# Patient Record
Sex: FEMALE | Race: WHITE | NOT HISPANIC OR LATINO | Employment: OTHER | URBAN - METROPOLITAN AREA
[De-identification: names, ages, dates, MRNs, and addresses within clinical notes are randomized per-mention and may not be internally consistent; named-entity substitution may affect disease eponyms.]

---

## 2019-10-31 ENCOUNTER — TELEPHONE (OUTPATIENT)
Dept: NEUROLOGY | Facility: CLINIC | Age: 64
End: 2019-10-31

## 2019-10-31 NOTE — TELEPHONE ENCOUNTER
----- Message from Ela Madden sent at 10/30/2019  2:42 PM CDT -----  Regarding: External referral   Hello,    Patient is being referred to Dr. Miller for small fiber neuropathy by Dr. Yrn Martinez. Referral and records are scanned into . I am unable to schedule for Dr. Miller and no appointments are pulling.    Can you please review and contact patient to schedule appointment. Referring office can be reached at [phone].    Thank you,  Ela  Vel Roth

## 2019-10-31 NOTE — TELEPHONE ENCOUNTER
Called pt to schedule consult with Dr. Miller for possible small fiber neuropathy from referral from Dr. Martinez; date/time/location confirmed; appt added to waiting list; verbalized understanding; appt slip mailed to address confirmed.

## 2019-12-13 ENCOUNTER — OFFICE VISIT (OUTPATIENT)
Dept: NEUROLOGY | Facility: CLINIC | Age: 64
End: 2019-12-13
Payer: COMMERCIAL

## 2019-12-13 VITALS
HEART RATE: 84 BPM | DIASTOLIC BLOOD PRESSURE: 78 MMHG | BODY MASS INDEX: 21.83 KG/M2 | SYSTOLIC BLOOD PRESSURE: 160 MMHG | WEIGHT: 127.88 LBS | HEIGHT: 64 IN | RESPIRATION RATE: 18 BRPM

## 2019-12-13 DIAGNOSIS — R79.1 ABNORMAL COAGULATION PROFILE: ICD-10-CM

## 2019-12-13 DIAGNOSIS — G62.9 SMALL FIBER NEUROPATHY: Primary | ICD-10-CM

## 2019-12-13 DIAGNOSIS — M79.2 NEUROPATHIC PAIN: ICD-10-CM

## 2019-12-13 PROCEDURE — 99205 PR OFFICE/OUTPT VISIT, NEW, LEVL V, 60-74 MIN: ICD-10-PCS | Mod: S$GLB,,, | Performed by: PSYCHIATRY & NEUROLOGY

## 2019-12-13 PROCEDURE — 99999 PR PBB SHADOW E&M-EST. PATIENT-LVL III: ICD-10-PCS | Mod: PBBFAC,,, | Performed by: PSYCHIATRY & NEUROLOGY

## 2019-12-13 PROCEDURE — 99999 PR PBB SHADOW E&M-EST. PATIENT-LVL III: CPT | Mod: PBBFAC,,, | Performed by: PSYCHIATRY & NEUROLOGY

## 2019-12-13 PROCEDURE — 3008F PR BODY MASS INDEX (BMI) DOCUMENTED: ICD-10-PCS | Mod: CPTII,S$GLB,, | Performed by: PSYCHIATRY & NEUROLOGY

## 2019-12-13 PROCEDURE — 99205 OFFICE O/P NEW HI 60 MIN: CPT | Mod: S$GLB,,, | Performed by: PSYCHIATRY & NEUROLOGY

## 2019-12-13 PROCEDURE — 3008F BODY MASS INDEX DOCD: CPT | Mod: CPTII,S$GLB,, | Performed by: PSYCHIATRY & NEUROLOGY

## 2019-12-13 RX ORDER — GABAPENTIN 100 MG/1
200 CAPSULE ORAL 4 TIMES DAILY
COMMUNITY
End: 2020-03-03 | Stop reason: SDUPTHER

## 2019-12-13 RX ORDER — LORAZEPAM 0.5 MG/1
0.5 TABLET ORAL NIGHTLY
COMMUNITY
Start: 2019-11-11 | End: 2024-01-29

## 2019-12-13 RX ORDER — LANOLIN ALCOHOL/MO/W.PET/CERES
100 CREAM (GRAM) TOPICAL DAILY
COMMUNITY

## 2019-12-13 RX ORDER — ERGOCALCIFEROL (VITAMIN D2) 10 MCG
TABLET ORAL
COMMUNITY

## 2019-12-13 RX ORDER — ASPIRIN 81 MG/1
81 TABLET ORAL
COMMUNITY
End: 2024-01-29

## 2019-12-13 RX ORDER — LEVOTHYROXINE SODIUM 50 UG/1
TABLET ORAL
COMMUNITY
Start: 2019-04-04 | End: 2024-01-29 | Stop reason: SDUPTHER

## 2019-12-13 RX ORDER — ACETAMINOPHEN 500 MG
10 TABLET ORAL
COMMUNITY

## 2019-12-13 RX ORDER — DULOXETIN HYDROCHLORIDE 20 MG/1
20 CAPSULE, DELAYED RELEASE ORAL
COMMUNITY
Start: 2019-10-12 | End: 2019-12-23 | Stop reason: SDUPTHER

## 2019-12-13 RX ORDER — CEPHRADINE 500 MG
200 CAPSULE ORAL 2 TIMES DAILY
COMMUNITY
End: 2021-01-21

## 2019-12-13 NOTE — PROGRESS NOTES
NEUROLOGY  Outpatient CONSULT    [facility]  [address] [facility] [address], [place] [address]  [phone] (office) / [phone] (fax)    Patient Name:  Michelle Denson  :  1955  MR #:  [id number]  Acct #:  [id number]    Date of Neurology Consult: 2019  Name of Neurologist: Heather Miller D.O, ABPN, AOBNP, ABEM    Other Physicians:  Julia Croft MD (Primary Care Physician); Yrn Martinez MD (Referring)      Chief Complaint: Numbness (numbness/tingling generalized; ref per Dr. Martinez)      History of Present Illness (HPI):  Michelle Denson is a 64 y.o. female referred for consultation regarding small fiber neuropathy.    Patient states that she has been having painful burning sensations for 2 years.  She states it started in her feet as tingling, that seemed to come on suddenly.  She states from there she had cold sensations in the feet.  She had vascular studies and saw cardiology and these were normal. She then started having burning in the feet, hands and lips.  She feels it is traveling up the arms now.  She states it would feel like a sunburn pain or a clawing pain.  She states about 2 weeks into this, she had some terrible mouth sores.  She states it took weeks for them to clear, causing her to lose some weight.    She was seen by Dr. Hoffmann in [employer] [facility] first.  She was then seen by Dr. Martinez.  They feel they have run out of things to test for so thought she would come for another opinion.    There was no preceding illness.  She had been on a multivitamin.  She added B complex after the neuropathy symptoms started.  She since stopped because her B6 was high. She stopped her simvastatin though she had been on it for a few years.    She has no digestion issues.  She has no diarrhea or constipation.  She had no previous surgery.  She has no abnormal sweating.  She has no vision changes.  She has no problems with dizziness or passing out.  She has not noted a change in gait or  increase in falls.    She has had bloodwork.  She had an MRI of her brain and spine.  She had an ultrasound of her legs.  She had an EMG and was told this was fine.  Skin biopsy.      She has had a colonoscopy in the past, this was fine.    Treatment to date:   B12  Cymbalta - helps  Gabapentin - helps  Horizant - made her wired and she could not sleep      Review of Systems:   General: Weight gain: No, Weight Loss: Yes, Fatigue: No,   Fever: No, Chills: No, Night Sweats: No, Insomnia: Yes, Excessive sleeping: No   Respiratory:  Cough: No, Shortness of Breath: No,   Wheezing: No, Excessive Snoring: No, Coughing up blood: No  Endocrine: Heat Intolerance: Yes, Cold Intolerance: Yes,   Excessive Thirst: No, Excessive Hunger: No,   Eyes:  Blurred Vision: No, Double Vision: No,   Light Sensitivity: No, Eye pain: No  Musculoskeletal: Muscle Aches/Pain: No, Joint Pain/Swelling: No, Muscle Cramps: No, Muscle Weakness: No, Neck Pain: No, Back Pain: No   Neurological: Difficulty Walking/Falls: No, Headache Migraine: No, Dizziness/Vertigo: No, Fainting: No, Difficulty with Speech: No, Weakness: No, Tingling/Numbness: Yes, Tremors: No, Memory Problems: No, Seizures: No, Difficulty Swallowing: No, Altered Taste: No.  Cardiovascular: Chest Pain: No, Shortness of Breath: No,   Palpitations: No,  Gastrointestinal: Nausea/Vomiting: No, Constipation: No, Diarrhea: No, Bloody Stools: No   Psych/Cog:  Depression: No, Anxiety: No, Hallucinations: No, Problems Concentrating: No  : Frequent Urination: No, Incontinence: No, Blood of Urine: No, Urinary Infections: No, Changes in Sex Drive: No   ENT:Hearing Loss: No, Earache: No, Ringing in Ears: No,   Facial Pain: No, Chronic Congestion: No   Immune: Seasonal Allergies: No, Hives and/or Rashes: No  The remainder of the review of twelve body systems was reviewed and normal.    Past Medical, Surgical, Family & Social History:   Past Medical History:   Diagnosis Date    Hypertension      "Neurocutaneous syndrome     Neuropathy      Past Surgical History:   Procedure Laterality Date    APPENDECTOMY      TONSILLECTOMY       History reviewed. No pertinent family history.  Alcohol use:  reports that she drank alcohol.   (Of note, 0.6 oz = 1 beer or 6 oz = 10 beers).  Tobacco use:  reports that she has quit smoking. Her smoking use included cigarettes. She has never used smokeless tobacco.  Street drug use:  reports that she does not use drugs.  Allergies: Amitriptyline and Nortriptyline.    Home Medications:     Current Outpatient Medications:     alpha lipoic acid 200 mg Cap, Take 200 mg by mouth 2 (two) times daily., Disp: , Rfl:     aspirin (ECOTRIN) 81 MG EC tablet, Take 81 mg by mouth., Disp: , Rfl:     cyanocobalamin (VITAMIN B-12) 1000 MCG tablet, Take 100 mcg by mouth once daily., Disp: , Rfl:     DULoxetine (CYMBALTA) 20 MG capsule, Take 20 mg by mouth., Disp: , Rfl:     ergocalciferol, vitamin D2, 400 unit Tab, Take by mouth., Disp: , Rfl:     gabapentin (NEURONTIN) 300 MG capsule, Take 200 mg by mouth 4 (four) times daily., Disp: , Rfl:     levothyroxine (SYNTHROID) 50 MCG tablet, levothyroxine 50 mcg tablet  Take 1 tablet every day by oral route., Disp: , Rfl:     LORazepam (ATIVAN) 0.5 MG tablet, Take 0.5 mg by mouth every evening., Disp: , Rfl:     melatonin 5 mg Tab, Take by mouth., Disp: , Rfl:     metoprolol marino-hydrochlorothiaz 25-12.5 mg Tb24, metoprolol succ 25 mg-hydrochlorothiazide 12.5 mg tablet,ext.rel 24 hr  Take 0.05 tablets every day by oral route., Disp: , Rfl:     NON FORMULARY MEDICATION, Take by mouth once daily., Disp: , Rfl:     Physical Examination:  BP (!) 160/78   Pulse 84   Resp 18   Ht 5' 4" (1.626 m)   Wt 58 kg (127 lb 13.9 oz)   BMI 21.95 kg/m²     GENERAL:  General appearance: Well, non-toxic appearing.  No apparent distress.  Fundi exam: normal.  Neck: supple.  Carotid auscultation: normal.  Heart auscultation: normal.  Peripheral pulses: " normal.  Extremities: normal.    MENTAL STATUS:  Alertness, attention span & concentration: normal.  Language: normal.  Orientation to self, place & time:  normal.  Memory, recent & remote: normal.  Fund of knowledge: normal.    SPEECH:  Clear and fluent.  Follows complex commands.    CRANIAL NERVES:  Cranial Nerves II-XII were examined.  II - Visual fields: normal.  III, IV, VI: PERRL, EOMI, No ptosis, No nystagmus.  V - Facial sensation: normal.  VII - Face symmetry & mobility: normal.  VIII - Hearing: normal.  IX, X - Palate: mobile & midline.  XI - Shoulder shrug: normal.  XII - Tongue protrusion: normal.    GROSS MOTOR:  Gait & station: normal.  Tone: normal.  Abnormal movements: none.  Finger-nose & Heel-knee-shin: normal.  Rapid alternating movements & drift: normal.  Romberg: absent    MUSCLE STRENGTH:     Fascics Atrophy RIGHT    LEFT Atrophy Fascics     5 Neck Ext. 5       5 Neck Flex 5       5 Deltoids 5       5 Sh.Ext.Rot. 5       5 Sh.Int.Rot. 5       5 Biceps 5       5 Triceps 5       5 Forearm.Pr. 5       5 Wrist Ext. 5       5 Wrist Flex 5       5 Finger Ext. 5       5 Finger Flex 5       5 FPL 5       5 Inteross. 5                         5 Iliopsoas 5       5 Hip Abduct 5       5 Hip Adduct 5       5 Quads 5       5 Hams 5       5 Dorsiflex 5       5 Plantar Flex 5       5 Ankle Oleksandr 5       5 Ankle Invert 5       5 Toe Ext. 5       5 Toe Flex 5                         REFLEXES:    RIGHT Reflex   LEFT   2+ Biceps 2+   2+ Brachiorad. 2+   2+ Triceps 2+        2+ Patellar 2+   2+ Ankle 2+        Down PLANTAR Down     SENSORY:  Light touch: Normal throughout.  Sharp touch: Normal throughout.  Vibration: Normal throughout.  Temperature: Normal throughout.    Diagnostic Data Reviewed:   Records from Dr. Martinez's office were reviewed.  Patient was seen for small fiber neuropathy.  Labs were completed.  Skin biopsy was completed.  Patient was using neuropathic pain medications for management of symptoms.   Referred for second opinion.    Lab results available today reveal KAREN, Free Kappa, Free Lambda, SSA and SSB were negative.    EMG from Dr. Hoffmann will be requested.  Skin biopsy results from Dr. Martinez will be requested.  Labs from Dr. Martinez will be requested.    Patient reports Lyme was neg.  HIV was tested and neg.  Colonoscopy was neg.  Skin bx with small fiber neuropathy.    Assessment and Plan:  Michelle Denson is a 64 y.o., right handed woman with a history of painful peripheral neuropathy believed to be small fiber neuropathy.  Etiology is unclear at this time, though it could be idiopathic in nature.  We discussed the workup for such a condition.  We reviewed possible etiologies, both treatable and untreatable.      She reports having had a skin biopsy, this has been requested in particular to see if they stained for amyloid.    Labs ordered today to look for potential etiologies.    She will increase her Cymbalta to 40mg for pain control.    As noted, records were requested.    The patient will return to clinic in 2 months.    Important to note, also  has a past medical history of Hypertension, Neurocutaneous syndrome, and Neuropathy.           Heather Miller D.O, ABPN, AOBNP, ABEM    This note was created with voice recognition software.  Grammatical, syntax and spelling errors may be inevitable.

## 2019-12-13 NOTE — LETTER
December 23, 2019      Yrn Martinez MD  [address]  [facility] [place] [address]           [facility]  [address] [place] [address]  Phone: [phone]  Fax: [phone]          Patient: Michelle Denson   MR Number: [id number]   YOB: 1955   Date of Visit: 12/13/2019       Dear Dr. Yrn Martinez:    Thank you for referring Michelle Denson to me for evaluation. Attached you will find relevant portions of my assessment and plan of care.    If you have questions, please do not hesitate to call me. I look forward to following Michelle Denson along with you.    Sincerely,    Ani Gilman RN    Enclosure  CC:  No Recipients    If you would like to receive this communication electronically, please contact [email or web address] or [phone] to request more information on [employer] Link access.    For providers and/or their staff who would like to refer a patient to [facility], please contact us through our one-stop-shop provider referral line, [facility], at [phone].    If you feel you have received this communication in error or would no longer like to receive these types of communications, please e-mail [email or web address]

## 2019-12-13 NOTE — PATIENT INSTRUCTIONS
Will request labs and skin biopsy from Dr. Martinez's office  Will request the EMG from Dr. Hoffmann's office    Increase Cymbalta to 40mg after dinner.

## 2019-12-19 ENCOUNTER — TELEPHONE (OUTPATIENT)
Dept: NEUROLOGY | Facility: CLINIC | Age: 64
End: 2019-12-19

## 2019-12-20 ENCOUNTER — TELEPHONE (OUTPATIENT)
Dept: NEUROLOGY | Facility: CLINIC | Age: 64
End: 2019-12-20

## 2019-12-20 DIAGNOSIS — G60.9 IDIOPATHIC PERIPHERAL NEUROPATHY: ICD-10-CM

## 2019-12-20 DIAGNOSIS — G62.9 NEUROPATHY: Primary | ICD-10-CM

## 2019-12-20 RX ORDER — DULOXETIN HYDROCHLORIDE 20 MG/1
40 CAPSULE, DELAYED RELEASE ORAL DAILY
Qty: 60 CAPSULE | Refills: 11 | Status: CANCELLED | OUTPATIENT
Start: 2019-12-20 | End: 2020-12-19

## 2019-12-20 NOTE — TELEPHONE ENCOUNTER
Returned call to pt and notified that results from Dr. Martinez not received at this time but would notify her if she needed additional blood work. Also, requested Dr. Miller to fill her cymbalta. Will notify pt when request complete.

## 2019-12-20 NOTE — TELEPHONE ENCOUNTER
----- Message from Linh Cote sent at 12/20/2019 10:34 AM CST -----  Contact: self   Patient want to speak with a nurse regarding if she need more blood work done please call back at [phone] (home) also need a refill on cymbalta     [id number] - [place] - [address] AT [facility] [address]  [address] [facility] [address]  Phone: [phone] Fax: [phone]

## 2019-12-23 RX ORDER — DULOXETIN HYDROCHLORIDE 20 MG/1
40 CAPSULE, DELAYED RELEASE ORAL DAILY
Qty: 180 CAPSULE | Refills: 3 | Status: SHIPPED | OUTPATIENT
Start: 2019-12-23 | End: 2020-02-07 | Stop reason: SDUPTHER

## 2019-12-23 NOTE — ADDENDUM NOTE
Addended by: ZEV GAY on: 12/23/2019 01:28 PM     Modules accepted: Orders     Principal Discharge DX:	Headache  Instructions for follow-up, activity and diet:	1) Please follow-up with Dr. Moreno within the next 3 days, his service can also be reached if still having issues.  Please call today or tomorrow for an appointment.  If you cannot follow-up with your doctor(s), please return to the ED for any urgent issues.  2) If you have any worsening of symptoms or any other concerns please return to the ED immediately.  3) Please continue taking your home medications as directed. Follow the regimen by your doctor: Tramadol 50mg as needed twice a day, Reglan 10mg as needed 4 times a day, Medrol Dosepak, Valium 5mg rescue dose (up to 3 times daily).  4) You may have been given a copy of your labs and/or imaging.  Please go over these with your primary care doctor. Your MRI of the brain was stable from 2009.

## 2020-01-29 ENCOUNTER — TELEPHONE (OUTPATIENT)
Dept: NEUROLOGY | Facility: CLINIC | Age: 65
End: 2020-01-29

## 2020-01-29 DIAGNOSIS — G60.9 IDIOPATHIC PERIPHERAL NEUROPATHY: ICD-10-CM

## 2020-01-29 NOTE — TELEPHONE ENCOUNTER
Returned call to pt; pt is requesting that you review scanned labs and records from Ailyn Hoffmann and Michelle and get her some results. Also, can she increase cymbalta to 60 mg? Please call in if so. Does pt need follow up visit?

## 2020-01-29 NOTE — TELEPHONE ENCOUNTER
----- Message from Princess MARIELENA Daily sent at 1/29/2020 10:56 AM CST -----  Contact: Morenita  Type: Needs Medical Advice    Who Called:  Patient  Best Call Back Number:   Additional Information: Requesting a call in regards to patient has some question about some test results the office was supposed to receive from her other Provider Dr. Nichols. She also some has question on the increase of her Cymbalta to 60mg

## 2020-02-07 ENCOUNTER — PATIENT MESSAGE (OUTPATIENT)
Dept: NEUROLOGY | Facility: CLINIC | Age: 65
End: 2020-02-07

## 2020-02-07 ENCOUNTER — TELEPHONE (OUTPATIENT)
Dept: NEUROLOGY | Facility: CLINIC | Age: 65
End: 2020-02-07

## 2020-02-07 RX ORDER — DULOXETIN HYDROCHLORIDE 60 MG/1
60 CAPSULE, DELAYED RELEASE ORAL DAILY
Qty: 90 CAPSULE | Refills: 3 | Status: SHIPPED | OUTPATIENT
Start: 2020-02-07 | End: 2021-01-21 | Stop reason: SDUPTHER

## 2020-02-07 NOTE — TELEPHONE ENCOUNTER
Spoke to patient by phone.  Reviewed the bloodwork from her other provider.  There are still some things that can be checked.  We can do this at her follow up visit.      She is still having daily pain.  Will increase her Cymbalta to 60mg daily.    She is going to look for a copy of her skin biopsy report.  Still have not received this per our request from her doctor. Will request again.    She will need a follow up scheduled with me.  She would like to transfer care.

## 2020-02-07 NOTE — TELEPHONE ENCOUNTER
Labs reviewed:  SPEP is normal  Kappa and Lambda light chains normal.  SSA, SSB normal  B6 normal  TTG normal  MMA normla  Copper normal  A1c normal  ACE normal  Hep C normal  B1 normal

## 2020-02-07 NOTE — TELEPHONE ENCOUNTER
Returned call to pt and scheduled neuropathy f/u with Dr. Miller. DAte/time/location confirmed. Verbalized understanding.

## 2020-02-07 NOTE — TELEPHONE ENCOUNTER
Skin biopsy received today.  Performed 3/20/18.    Left thigh:  Skin with low normal nerve fiber density.  Left calf:  Skin with normal fiber density.    Test is suggestive, but not diagnostic of small fiber neuropathy.

## 2020-02-07 NOTE — TELEPHONE ENCOUNTER
----- Message from Tonya Taylor sent at 2/6/2020 10:28 AM CST -----  Contact: self [phone]  She is calling to follow up on her call of 1/29.  Please call her with an answer.  Thank you!

## 2020-03-03 ENCOUNTER — LAB VISIT (OUTPATIENT)
Dept: LAB | Facility: HOSPITAL | Age: 65
End: 2020-03-03
Attending: PSYCHIATRY & NEUROLOGY
Payer: COMMERCIAL

## 2020-03-03 ENCOUNTER — OFFICE VISIT (OUTPATIENT)
Dept: NEUROLOGY | Facility: CLINIC | Age: 65
End: 2020-03-03
Payer: COMMERCIAL

## 2020-03-03 VITALS
HEART RATE: 95 BPM | HEIGHT: 64 IN | BODY MASS INDEX: 21.96 KG/M2 | SYSTOLIC BLOOD PRESSURE: 137 MMHG | DIASTOLIC BLOOD PRESSURE: 80 MMHG | WEIGHT: 128.63 LBS

## 2020-03-03 DIAGNOSIS — G60.9 IDIOPATHIC SMALL FIBER PERIPHERAL NEUROPATHY: ICD-10-CM

## 2020-03-03 DIAGNOSIS — G60.9 IDIOPATHIC SMALL FIBER PERIPHERAL NEUROPATHY: Primary | ICD-10-CM

## 2020-03-03 PROCEDURE — 3008F BODY MASS INDEX DOCD: CPT | Mod: CPTII,S$GLB,, | Performed by: PSYCHIATRY & NEUROLOGY

## 2020-03-03 PROCEDURE — 99214 OFFICE O/P EST MOD 30 MIN: CPT | Mod: S$GLB,,, | Performed by: PSYCHIATRY & NEUROLOGY

## 2020-03-03 PROCEDURE — 82595 ASSAY OF CRYOGLOBULIN: CPT

## 2020-03-03 PROCEDURE — 80074 ACUTE HEPATITIS PANEL: CPT

## 2020-03-03 PROCEDURE — 99214 PR OFFICE/OUTPT VISIT, EST, LEVL IV, 30-39 MIN: ICD-10-PCS | Mod: S$GLB,,, | Performed by: PSYCHIATRY & NEUROLOGY

## 2020-03-03 PROCEDURE — 99999 PR PBB SHADOW E&M-EST. PATIENT-LVL III: CPT | Mod: PBBFAC,,, | Performed by: PSYCHIATRY & NEUROLOGY

## 2020-03-03 PROCEDURE — 86140 C-REACTIVE PROTEIN: CPT

## 2020-03-03 PROCEDURE — 86235 NUCLEAR ANTIGEN ANTIBODY: CPT

## 2020-03-03 PROCEDURE — 3008F PR BODY MASS INDEX (BMI) DOCUMENTED: ICD-10-PCS | Mod: CPTII,S$GLB,, | Performed by: PSYCHIATRY & NEUROLOGY

## 2020-03-03 PROCEDURE — 99999 PR PBB SHADOW E&M-EST. PATIENT-LVL III: ICD-10-PCS | Mod: PBBFAC,,, | Performed by: PSYCHIATRY & NEUROLOGY

## 2020-03-03 PROCEDURE — 82300 ASSAY OF CADMIUM: CPT

## 2020-03-03 PROCEDURE — 36415 COLL VENOUS BLD VENIPUNCTURE: CPT | Mod: PO

## 2020-03-03 RX ORDER — GABAPENTIN 300 MG/1
600 CAPSULE ORAL 3 TIMES DAILY
Qty: 180 CAPSULE | Refills: 5 | Status: SHIPPED | OUTPATIENT
Start: 2020-03-03 | End: 2020-06-30

## 2020-03-03 NOTE — PROGRESS NOTES
NEUROLOGY  Outpatient Follow Up    [facility]  [address], [place] [address]  [phone] (office) / [phone] (fax)    Patient Name:  Michelle Denson  :  1955  MR #:  [id number]  Acct #:  [id number]    Date of Neurology Visit: 2020  Name of Neurologist: Heather Miller D.O, ABPN, AOBNP, ABEM    Other Physicians:  Julia Croft MD (Primary Care Physician); No ref. provider found (Referring)      Chief Complaint: Follow-up      History of Present Illness (HPI):  Michelle Denson is a 65 y.o. female here for follow up of neuropathy.    She initially thought Cymbalta was working, but when the weather started to get more hot and humid her pain came back.      She states the last few days it has become worse again.  She feels it is in more locations as well.  She states the pain feels like a bad sunburn on her arms and ears.  Her lips feel like she has spicy food stuck on her lips.  Her feet feel like she is walking on hot concrete.  She can also feel like someone is clawing her arms.  She also feels a buzzing sensation, often coming on before the pain.    Gabapentin 100mg is currently 1 in am, 2 at noon, 2 at dinner and 3 at night.  Her pain seems to escalate in the afternoon.    Cymbalta is at 60mg for the past few weeks.    She has no changes in bowel or bladder function.  She is not dizzy or passing out.  She has normal appetite and does not have digestive issues.  She does not have abnormal sweating.  She has felt like she had something in the back of her throat, but the ENT said it was GERD.  It did go away after taking medication for that.    Initial history:  Michelle Denson is a 64 y.o. female referred for consultation regarding small fiber neuropathy.     Patient states that she has been having painful burning sensations for 2 years.  She states it started in her feet as tingling, that seemed to come on suddenly.  She states from there she had cold sensations  in the feet.  She had vascular studies and saw cardiology and these were normal. She then started having burning in the feet, hands and lips.  She feels it is traveling up the arms now.  She states it would feel like a sunburn pain or a clawing pain.  She states about 2 weeks into this, she had some terrible mouth sores.  She states it took weeks for them to clear, causing her to lose some weight.     She was seen by Dr. Hoffmann in [place] first.  She was then seen by Dr. Martinez.  They feel they have run out of things to test for so thought she would come for another opinion.     There was no preceding illness.  She had been on a multivitamin.  She added B complex after the neuropathy symptoms started.  She since stopped because her B6 was high. She stopped her simvastatin though she had been on it for a few years.     She has no digestion issues.  She has no diarrhea or constipation.  She had no previous surgery.  She has no abnormal sweating.  She has no vision changes.  She has no problems with dizziness or passing out.  She has not noted a change in gait or increase in falls.     She has had bloodwork.  She had an MRI of her brain and spine.  She had an ultrasound of her legs.  She had an EMG and was told this was fine.  Skin biopsy.       She has had a colonoscopy in the past, this was fine.     Treatment to date:   B12  Cymbalta 60mg/day - helps  Gabapentin 800mg/day- helps  Horizant - made her wired and she could not sleep  Amitriptyline - side effects  Nortriptyline - side effects  Lyrica - vertigo  Lexapro - no help    Review of Systems:    General: Weight gain: Yes, Weight Loss: No, Fatigue: No,   Fever: No, Chills: No, Night Sweats: No, Insomnia: Yes, Excessive sleeping: No   Respiratory:  Cough: No, Shortness of Breath: No,   Wheezing: No, Excessive Snoring: No, Coughing up blood: No  Endocrine: Heat Intolerance: Yes, Cold Intolerance: No,   Excessive Thirst: No, Excessive Hunger: No,   Eyes:  Blurred  Vision: No, Double Vision: No,   Light Sensitivity: No, Eye pain: No  Musculoskeletal: Muscle Aches/Pain: No, Joint Pain/Swelling: No, Muscle Cramps: No, Muscle Weakness: No, Neck Pain: No, Back Pain: No   Neurological: Difficulty Walking/Falls: No, Headache Migraine: No, Dizziness/Vertigo: No, Fainting: No, Difficulty with Speech: No, Weakness: No, Tingling/Numbness: No, Tremors: No, Memory Problems: No, Seizures: No, Difficulty Swallowing: No, Altered Taste: No.  Cardiovascular: Chest Pain: No, Shortness of Breath: No,   Palpitations: No,  Gastrointestinal: Nausea/Vomiting: No, Constipation: No, Diarrhea: No, Bloody Stools: No   Psych/Cog:  Depression: No, Anxiety: No, Hallucinations: No, Problems Concentrating: No  : Frequent Urination: No, Incontinence: No, Blood of Urine: No, Urinary Infections: No, Changes in Sex Drive: No   ENT:Hearing Loss: No, Earache: No, Ringing in Ears: No,   Facial Pain: No, Chronic Congestion: No   Immune: Seasonal Allergies: No, Hives and/or Rashes: No  The remainder of the review of twelve body systems was reviewed and normal.    Past Medical, Surgical, Family & Social History:   Reviewed and updated.    Home Medications:     Current Outpatient Medications:     alpha lipoic acid 200 mg Cap, Take 200 mg by mouth 2 (two) times daily., Disp: , Rfl:     aspirin (ECOTRIN) 81 MG EC tablet, Take 81 mg by mouth., Disp: , Rfl:     cyanocobalamin (VITAMIN B-12) 1000 MCG tablet, Take 100 mcg by mouth once daily., Disp: , Rfl:     DULoxetine (CYMBALTA) 60 MG capsule, Take 1 capsule (60 mg total) by mouth once daily., Disp: 90 capsule, Rfl: 3    ergocalciferol, vitamin D2, 400 unit Tab, Take by mouth., Disp: , Rfl:     gabapentin (NEURONTIN) 100 MG capsule, Take 200 mg by mouth 4 (four) times daily. , Disp: , Rfl:     levothyroxine (SYNTHROID) 50 MCG tablet, levothyroxine 50 mcg tablet  Take 1 tablet every day by oral route., Disp: , Rfl:     LORazepam (ATIVAN) 0.5 MG tablet, Take 0.5  "mg by mouth every evening., Disp: , Rfl:     melatonin 5 mg Tab, Take by mouth., Disp: , Rfl:     metoprolol marino-hydrochlorothiaz 25-12.5 mg Tb24, 2 (two) times daily. , Disp: , Rfl:     NON FORMULARY MEDICATION, Take by mouth once daily., Disp: , Rfl:     Physical Examination:  /80   Pulse 95   Ht 5' 4" (1.626 m)   Wt 58.3 kg (128 lb 10.2 oz)   BMI 22.08 kg/m²     GENERAL:  General appearance: Well, non-toxic appearing.  No apparent distress.  Extremities: normal.    MENTAL STATUS:  Alertness, attention span & concentration: normal.  Language: normal.  Orientation to self, place & time:  normal.  Memory, recent & remote: normal.  Fund of knowledge: normal.     SPEECH:  Clear and fluent.  Follows complex commands.     CRANIAL NERVES:  Cranial Nerves II-XII were examined.  II - Visual fields: normal.  III, IV, VI: PERRL, EOMI, No ptosis, No nystagmus.  V - Facial sensation: normal.  VII - Face symmetry & mobility: normal.  VIII - Hearing: normal.  IX, X - Palate: mobile & midline.  XI - Shoulder shrug: normal.  XII - Tongue protrusion: normal.     GROSS MOTOR:  Gait & station: normal.  Tone: normal.  Abnormal movements: none.  Finger-nose & Heel-knee-shin: normal.  Rapid alternating movements & drift: normal.  Romberg: absent     MUSCLE STRENGTH:      Fascics Atrophy RIGHT      LEFT Atrophy Fascics       5 Neck Ext. 5           5 Neck Flex 5           5 Deltoids 5           5 Sh.Ext.Rot. 5           5 Sh.Int.Rot. 5           5 Biceps 5           5 Triceps 5           5 Forearm.Pr. 5           5 Wrist Ext. 5           5 Wrist Flex 5           5 Finger Ext. 5           5 Finger Flex 5           5 FPL 5           5 Inteross. 5                                           5 Iliopsoas 5           5 Hip Abduct 5           5 Hip Adduct 5           5 Quads 5           5 Hams 5           5 Dorsiflex 5           5 Plantar Flex 5           5 Ankle Oleksandr 5           5 Ankle Invert 5           5 Toe Ext. 5           5 " Toe Flex 5                                          REFLEXES:     RIGHT Reflex    LEFT   2+ Biceps 2+   2+ Brachiorad. 2+   2+ Triceps 2+           2+ Patellar 2+   2+ Ankle 2+           Down PLANTAR Down      SENSORY:  Light touch: Normal throughout.  Sharp touch: Normal throughout.     Diagnostic Data Reviewed:   Records from Dr. Martinez's office were reviewed.  Patient was seen for small fiber neuropathy.  Labs were completed.  Skin biopsy was completed.  Patient was using neuropathic pain medications for management of symptoms.  Referred for second opinion.     Lab results available today reveal KAREN, Free Kappa, Free Lambda, SSA and SSB were negative.     EMG from Dr. Hoffmann will be requested.  Skin biopsy results from Dr. Martinez will be requested.  Labs from Dr. Martinez will be requested.     Patient reports Lyme was neg.  HIV was tested and neg.  Colonoscopy was neg.  Skin bx with small fiber neuropathy.     Skin biopsy received today.  Performed 3/20/18.  Left thigh:  Skin with low normal nerve fiber density.  Left calf:  Skin with normal fiber density.  Test is suggestive, but not diagnostic of small fiber neuropathy.    Labs reviewed:  SPEP is normal  Kappa and Lambda light chains normal.  SSA, SSB normal  B6 normal  TTG normal  MMA normla  Copper normal  A1c normal  ACE normal  Hep C normal  B1 normal    Assessment and Plan:  Michelle Denson is a 64 y.o., right handed woman with a history of painful peripheral neuropathy believed to be small fiber neuropathy.  Based on her current workup, this appears to be idiopathic.  We discussed additional workup for autoimmune causes for such a condition.  She is agreeable.      Labs ordered today to look for other potential etiologies.     She will increase her Cymbalta to 60mg for pain control.  Will also titrate Gabapentin for pain control.    The patient will return to clinic in 3 months.    Important to note, also  has a past medical history of Hypertension,  Neurocutaneous syndrome, and Neuropathy.      This note was created with voice recognition software.  Grammatical, syntax and spelling errors may be inevitable.      Heather Miller D.O, ABPN, AOBNP, ABEM

## 2020-03-03 NOTE — PATIENT INSTRUCTIONS
Labs today to look for anything that would aggravate the neuropathic pain.    Continue Cymbalta at 60mg a day.    New Rx:  Gabapentin 300mg  Week AM Midday PM   1 1 1 1   2 1 1 2   3 2 1 2   4 and on 2 2 2     If the medication works at a lower dose, you do not need to keep advancing.    Common side effects are fatigue and lightheadedness.  These should pass after a few days.    Please contact the office with any questions.

## 2020-03-04 LAB
CRP SERPL-MCNC: 0.8 MG/L (ref 0–8.2)
HAV IGM SERPL QL IA: NEGATIVE
HBV CORE IGM SERPL QL IA: NEGATIVE
HBV SURFACE AG SERPL QL IA: NEGATIVE
HCV AB SERPL QL IA: NEGATIVE

## 2020-03-05 LAB
ARSENIC BLD-MCNC: <1 NG/ML (ref 0–12)
CADMIUM BLD-MCNC: 0.2 NG/ML (ref 0–4.9)
CITY: NORMAL
COUNTY: NORMAL
GUARDIAN FIRST NAME: NORMAL
GUARDIAN LAST NAME: NORMAL
HOME PHONE: NORMAL
LEAD BLD-MCNC: <1 MCG/DL (ref 0–4.9)
MERCURY BLD-MCNC: <1 NG/ML (ref 0–9)
RACE: NORMAL
STATE: NORMAL
STREET ADDRESS: NORMAL
VENOUS/CAPILLARY: NORMAL
ZIP: NORMAL

## 2020-03-07 LAB
ANTI SM ANTIBODY: 0.07 RATIO (ref 0–0.99)
ANTI-SM INTERPRETATION: NEGATIVE

## 2020-03-13 LAB — CRYOGLOB SER QL: NORMAL

## 2020-06-30 ENCOUNTER — OFFICE VISIT (OUTPATIENT)
Dept: NEUROLOGY | Facility: CLINIC | Age: 65
End: 2020-06-30
Payer: COMMERCIAL

## 2020-06-30 VITALS
WEIGHT: 132.25 LBS | HEART RATE: 87 BPM | TEMPERATURE: 97 F | RESPIRATION RATE: 16 BRPM | BODY MASS INDEX: 22.58 KG/M2 | DIASTOLIC BLOOD PRESSURE: 77 MMHG | HEIGHT: 64 IN | SYSTOLIC BLOOD PRESSURE: 131 MMHG

## 2020-06-30 DIAGNOSIS — G60.9 IDIOPATHIC SMALL FIBER PERIPHERAL NEUROPATHY: ICD-10-CM

## 2020-06-30 PROCEDURE — 3008F BODY MASS INDEX DOCD: CPT | Mod: CPTII,S$GLB,, | Performed by: PSYCHIATRY & NEUROLOGY

## 2020-06-30 PROCEDURE — 3008F PR BODY MASS INDEX (BMI) DOCUMENTED: ICD-10-PCS | Mod: CPTII,S$GLB,, | Performed by: PSYCHIATRY & NEUROLOGY

## 2020-06-30 PROCEDURE — 99214 OFFICE O/P EST MOD 30 MIN: CPT | Mod: S$GLB,,, | Performed by: PSYCHIATRY & NEUROLOGY

## 2020-06-30 PROCEDURE — 99999 PR PBB SHADOW E&M-EST. PATIENT-LVL IV: CPT | Mod: PBBFAC,,, | Performed by: PSYCHIATRY & NEUROLOGY

## 2020-06-30 PROCEDURE — 99999 PR PBB SHADOW E&M-EST. PATIENT-LVL IV: ICD-10-PCS | Mod: PBBFAC,,, | Performed by: PSYCHIATRY & NEUROLOGY

## 2020-06-30 PROCEDURE — 99214 PR OFFICE/OUTPT VISIT, EST, LEVL IV, 30-39 MIN: ICD-10-PCS | Mod: S$GLB,,, | Performed by: PSYCHIATRY & NEUROLOGY

## 2020-06-30 RX ORDER — GABAPENTIN 300 MG/1
900 CAPSULE ORAL 3 TIMES DAILY
Qty: 810 CAPSULE | Refills: 1 | Status: SHIPPED | OUTPATIENT
Start: 2020-06-30 | End: 2021-01-21 | Stop reason: SDUPTHER

## 2020-06-30 NOTE — PATIENT INSTRUCTIONS
Gabapentin 300mg    Week AM Midday PM   1 2 2 3   2 2 3 3   3 3 3 3       If the medication works at a lower dose, you do not need to keep advancing.  Common side effects are fatigue and lightheadedness.  These should pass after a few days.  Please contact the office with any questions.

## 2020-06-30 NOTE — PROGRESS NOTES
NEUROLOGY  Outpatient Follow Up    [facility]  [address], [place] [address]  [phone] (office) / [phone] (fax)    Patient Name:  Michelle Denson  :  1955  MR #:  [id number]  Acct #:  [id number]    Date of Neurology Visit: 2020  Name of Neurologist: Heather Miller D.O, ABPN, AOBNP, ABEM    Other Physicians:  Julia Croft MD (Primary Care Physician); No ref. provider found (Referring)      Chief Complaint: Follow-up and Pain      History of Present Illness (HPI):  Michelle Denson is a 65 y.o. female here for follow up of neuropathy.    She tolerated the increased Gabapentin.  The intensity is down.  She feels she is finally getting some relief.  She can tell if she misses a dose.  She is tolerating the Gabapentin well.      She cannot go a whole day without symptoms yet.  She feels worse at night.  Her feet will get puffy, red and burning.  Her lips also bother her.      This is the best she has been in 2 years. She is even walking her dog again.        Interval hx:  3/3/20  She initially thought Cymbalta was working, but when the weather started to get more hot and humid her pain came back.      She states the last few days it has become worse again.  She feels it is in more locations as well.  She states the pain feels like a bad sunburn on her arms and ears.  Her lips feel like she has spicy food stuck on her lips.  Her feet feel like she is walking on hot concrete.  She can also feel like someone is clawing her arms.  She also feels a buzzing sensation, often coming on before the pain.    Gabapentin 100mg is currently 1 in am, 2 at noon, 2 at dinner and 3 at night.  Her pain seems to escalate in the afternoon.    Cymbalta is at 60mg for the past few weeks.    She has no changes in bowel or bladder function.  She is not dizzy or passing out.  She has normal appetite and does not have digestive issues.  She does not have abnormal sweating.  She has felt  like she had something in the back of her throat, but the ENT said it was GERD.  It did go away after taking medication for that.    Initial history:  12/13/19  Michelle Denson is a 64 y.o. female referred for consultation regarding small fiber neuropathy.     Patient states that she has been having painful burning sensations for 2 years.  She states it started in her feet as tingling, that seemed to come on suddenly.  She states from there she had cold sensations in the feet.  She had vascular studies and saw cardiology and these were normal. She then started having burning in the feet, hands and lips.  She feels it is traveling up the arms now.  She states it would feel like a sunburn pain or a clawing pain.  She states about 2 weeks into this, she had some terrible mouth sores.  She states it took weeks for them to clear, causing her to lose some weight.     She was seen by Dr. Hoffmann in [place] first.  She was then seen by Dr. Martinez.  They feel they have run out of things to test for so thought she would come for another opinion.     There was no preceding illness.  She had been on a multivitamin.  She added B complex after the neuropathy symptoms started.  She since stopped because her B6 was high. She stopped her simvastatin though she had been on it for a few years.     She has no digestion issues.  She has no diarrhea or constipation.  She had no previous surgery.  She has no abnormal sweating.  She has no vision changes.  She has no problems with dizziness or passing out.  She has not noted a change in gait or increase in falls.     She has had bloodwork.  She had an MRI of her brain and spine.  She had an ultrasound of her legs.  She had an EMG and was told this was fine.  Skin biopsy.       She has had a colonoscopy in the past, this was fine.     Treatment to date:   B12  Cymbalta 60mg/day - helps  Gabapentin 600mg/900mg/900mg  Horizant - made her wired and she could not sleep  Amitriptyline - side  effects  Nortriptyline - side effects  Lyrica - vertigo  Lexapro - no help    Review of Systems:    General: Weight gain: Yes, Weight Loss: No, Fatigue: No,   Fever: No, Chills: No, Night Sweats: No, Insomnia: No, Excessive sleeping: No   Respiratory:  Cough: No, Shortness of Breath: No,   Wheezing: No, Excessive Snoring: No, Coughing up blood: No  Endocrine: Heat Intolerance: No, Cold Intolerance: No,   Excessive Thirst: No, Excessive Hunger: No,   Eyes:  Blurred Vision: No, Double Vision: No,   Light Sensitivity: No, Eye pain: No  Musculoskeletal: Muscle Aches/Pain: No, Joint Pain/Swelling: No, Muscle Cramps: No, Muscle Weakness: No, Neck Pain: No, Back Pain: No   Neurological: Difficulty Walking/Falls: No, Headache Migraine: No, Dizziness/Vertigo: No, Fainting: No, Difficulty with Speech: No, Weakness: No, Tingling/Numbness: Yes, Tremors: No, Memory Problems: No, Seizures: No, Difficulty Swallowing: No, Altered Taste: No.  Cardiovascular: Chest Pain: No, Shortness of Breath: No,   Palpitations: No,  Gastrointestinal: Nausea/Vomiting: No, Constipation: No, Diarrhea: No, Bloody Stools: No   Psych/Cog:  Depression: No, Anxiety: No, Hallucinations: No, Problems Concentrating: No  : Frequent Urination: No, Incontinence: No, Urinary Infections: No, Blood of Urine: No  ENT:Hearing Loss: No, Earache: No, Ringing in Ears: No,   Facial Pain: No, Chronic Congestion: No   Immune: Seasonal Allergies: No, Hives and/or Rashes: No  The remainder of the review of twelve body systems was reviewed and normal.    Past Medical, Surgical, Family & Social History:   Reviewed and updated.    Home Medications:     Current Outpatient Medications:     alpha lipoic acid 200 mg Cap, Take 200 mg by mouth 2 (two) times daily., Disp: , Rfl:     aspirin (ECOTRIN) 81 MG EC tablet, Take 81 mg by mouth., Disp: , Rfl:     cyanocobalamin (VITAMIN B-12) 1000 MCG tablet, Take 100 mcg by mouth once daily., Disp: , Rfl:     DULoxetine (CYMBALTA) 60  "MG capsule, Take 1 capsule (60 mg total) by mouth once daily., Disp: 90 capsule, Rfl: 3    ergocalciferol, vitamin D2, 400 unit Tab, Take by mouth., Disp: , Rfl:     gabapentin (NEURONTIN) 300 MG capsule, Take 2 capsules (600 mg total) by mouth 3 (three) times daily., Disp: 180 capsule, Rfl: 5    levothyroxine (SYNTHROID) 50 MCG tablet, levothyroxine 50 mcg tablet  Take 1 tablet every day by oral route., Disp: , Rfl:     LORazepam (ATIVAN) 0.5 MG tablet, Take 0.5 mg by mouth every evening., Disp: , Rfl:     melatonin 5 mg Tab, Take by mouth., Disp: , Rfl:     metoprolol marino-hydrochlorothiaz 25-12.5 mg Tb24, 2 (two) times daily. , Disp: , Rfl:     NON FORMULARY MEDICATION, Take by mouth once daily., Disp: , Rfl:     Physical Examination:  /77   Pulse 87   Temp 97.3 °F (36.3 °C)   Resp 16   Ht 5' 4" (1.626 m)   Wt 60 kg (132 lb 4.4 oz)   BMI 22.71 kg/m²     GENERAL:  General appearance: Well, non-toxic appearing.  No apparent distress.  Extremities: normal.    MENTAL STATUS:  Alertness, attention span & concentration: normal.  Language: normal.  Orientation to self, place & time:  normal.  Memory, recent & remote: normal.  Fund of knowledge: normal.     SPEECH:  Clear and fluent.  Follows complex commands.     CRANIAL NERVES:  Cranial Nerves II-XII were examined.  II - Visual fields: normal.  III, IV, VI: PERRL, EOMI, No ptosis, No nystagmus.  V - Facial sensation: normal.  VII - Face symmetry & mobility: normal.  VIII - Hearing: normal.  IX, X - Palate: mobile & midline.  XI - Shoulder shrug: normal.  XII - Tongue protrusion: normal.     GROSS MOTOR:  Gait & station: normal.  Tone: normal.  Abnormal movements: none.  Finger-nose & Heel-knee-shin: normal.  Rapid alternating movements & drift: normal.  Romberg: absent     MUSCLE STRENGTH:      Fascics Atrophy RIGHT      LEFT Atrophy Fascics       5 Neck Ext. 5           5 Neck Flex 5           5 Deltoids 5           5 Sh.Ext.Rot. 5           5 " Sh.Int.Rot. 5           5 Biceps 5           5 Triceps 5           5 Forearm.Pr. 5           5 Wrist Ext. 5           5 Wrist Flex 5           5 Finger Ext. 5           5 Finger Flex 5           5 FPL 5           5 Inteross. 5                                           5 Iliopsoas 5           5 Hip Abduct 5           5 Hip Adduct 5           5 Quads 5           5 Hams 5           5 Dorsiflex 5           5 Plantar Flex 5           5 Ankle Oleksandr 5           5 Ankle Invert 5           5 Toe Ext. 5           5 Toe Flex 5                                          REFLEXES:     RIGHT Reflex    LEFT   2+ Biceps 2+   2+ Brachiorad. 2+   2+ Triceps 2+           2+ Patellar 2+   2+ Ankle 2+           Down PLANTAR Down      SENSORY:  Light touch: Normal throughout.  Sharp touch: Normal throughout.     Diagnostic Data Reviewed:   Records from Dr. Martinez's office were reviewed.  Patient was seen for small fiber neuropathy.  Labs were completed.  Skin biopsy was completed.  Patient was using neuropathic pain medications for management of symptoms.  Referred for second opinion.     Lab results available today reveal KAREN, Free Kappa, Free Lambda, SSA and SSB were negative.     EMG from Dr. Hoffmann will be requested.  Labs from Dr. Martinez will be requested.     Patient reports Lyme was neg.  HIV was tested and neg.  Colonoscopy was neg.     Skin biopsy received.  Performed 3/20/18.  Left thigh:  Skin with low normal nerve fiber density.  Left calf:  Skin with normal fiber density.  Test is suggestive, but not diagnostic of small fiber neuropathy.    Labs reviewed:  SPEP is normal  Kappa and Lambda light chains normal.  SSA, SSB normal  B6 normal  TTG normal  MMA normla  Copper normal  A1c normal  ACE normal  Hep C normal  B1 normal      Component      Latest Ref Rng & Units 3/3/2020   Arsenic      0 - 12 ng/mL <1   Lead      0.0 - 4.9 mcg/dL <1.0   Cadmium      0.0 - 4.9 ng/mL 0.2   Mercury      0 - 9 ng/mL <1   Venous/Capillary        Venous   Hepatitis B Surface Ag      Negative Negative   Hep B C IgM      Negative Negative   Hep A IgM      Negative Negative   Hepatitis C Ab      Negative Negative   Anti Sm Antibody      0.00 - 0.99 Ratio 0.07   Anti-Sm Interpretation      Negative Negative   CRP      0.0 - 8.2 mg/L 0.8   Cryoglobulin, Qual      Absent Absent     Assessment and Plan:  Michelle Denson is a 64 y.o., right handed woman with a history of painful peripheral neuropathy believed to be small fiber neuropathy.  Based on her current workup, this appears to be idiopathic.  Her previous workup and additional workup in this clinic was normal.       She will continue Cymbalta 60mg daily.    She will try Gabapentin 900mg TID.  If this is too much for her she can always back down again.     The patient will return to clinic in 6 months.    Important to note, also  has a past medical history of Hypertension, Neurocutaneous syndrome, and Neuropathy.      This note was created with voice recognition software.  Grammatical, syntax and spelling errors may be inevitable.      Heather Miller D.O, ABPN, AOBNP, ABEM

## 2021-01-21 ENCOUNTER — OFFICE VISIT (OUTPATIENT)
Dept: NEUROLOGY | Facility: CLINIC | Age: 66
End: 2021-01-21
Payer: COMMERCIAL

## 2021-01-21 VITALS
HEART RATE: 83 BPM | DIASTOLIC BLOOD PRESSURE: 86 MMHG | BODY MASS INDEX: 23.09 KG/M2 | SYSTOLIC BLOOD PRESSURE: 134 MMHG | HEIGHT: 64 IN | WEIGHT: 135.25 LBS

## 2021-01-21 DIAGNOSIS — G60.9 IDIOPATHIC PERIPHERAL NEUROPATHY: ICD-10-CM

## 2021-01-21 DIAGNOSIS — G60.9 IDIOPATHIC SMALL FIBER PERIPHERAL NEUROPATHY: ICD-10-CM

## 2021-01-21 PROCEDURE — 3008F BODY MASS INDEX DOCD: CPT | Mod: CPTII,S$GLB,, | Performed by: PSYCHIATRY & NEUROLOGY

## 2021-01-21 PROCEDURE — 99999 PR PBB SHADOW E&M-EST. PATIENT-LVL IV: ICD-10-PCS | Mod: PBBFAC,,, | Performed by: PSYCHIATRY & NEUROLOGY

## 2021-01-21 PROCEDURE — 99213 OFFICE O/P EST LOW 20 MIN: CPT | Mod: S$GLB,,, | Performed by: PSYCHIATRY & NEUROLOGY

## 2021-01-21 PROCEDURE — 1125F AMNT PAIN NOTED PAIN PRSNT: CPT | Mod: S$GLB,,, | Performed by: PSYCHIATRY & NEUROLOGY

## 2021-01-21 PROCEDURE — 99213 PR OFFICE/OUTPT VISIT, EST, LEVL III, 20-29 MIN: ICD-10-PCS | Mod: S$GLB,,, | Performed by: PSYCHIATRY & NEUROLOGY

## 2021-01-21 PROCEDURE — 3008F PR BODY MASS INDEX (BMI) DOCUMENTED: ICD-10-PCS | Mod: CPTII,S$GLB,, | Performed by: PSYCHIATRY & NEUROLOGY

## 2021-01-21 PROCEDURE — 99999 PR PBB SHADOW E&M-EST. PATIENT-LVL IV: CPT | Mod: PBBFAC,,, | Performed by: PSYCHIATRY & NEUROLOGY

## 2021-01-21 PROCEDURE — 1125F PR PAIN SEVERITY QUANTIFIED, PAIN PRESENT: ICD-10-PCS | Mod: S$GLB,,, | Performed by: PSYCHIATRY & NEUROLOGY

## 2021-01-21 RX ORDER — METOPROLOL SUCCINATE 25 MG/1
12.5 TABLET, EXTENDED RELEASE ORAL 2 TIMES DAILY
COMMUNITY
Start: 2020-06-15

## 2021-01-21 RX ORDER — GABAPENTIN 300 MG/1
900 CAPSULE ORAL 3 TIMES DAILY
Qty: 810 CAPSULE | Refills: 3 | Status: SHIPPED | OUTPATIENT
Start: 2021-01-21 | End: 2022-01-04 | Stop reason: SDUPTHER

## 2021-01-21 RX ORDER — DULOXETIN HYDROCHLORIDE 60 MG/1
60 CAPSULE, DELAYED RELEASE ORAL DAILY
Qty: 90 CAPSULE | Refills: 3 | Status: SHIPPED | OUTPATIENT
Start: 2021-01-21 | End: 2022-01-04 | Stop reason: SDUPTHER

## 2021-04-29 ENCOUNTER — PATIENT MESSAGE (OUTPATIENT)
Dept: RESEARCH | Facility: HOSPITAL | Age: 66
End: 2021-04-29

## 2021-11-12 ENCOUNTER — TELEPHONE (OUTPATIENT)
Dept: NEUROLOGY | Facility: CLINIC | Age: 66
End: 2021-11-12
Payer: COMMERCIAL

## 2022-01-04 ENCOUNTER — OFFICE VISIT (OUTPATIENT)
Dept: NEUROLOGY | Facility: CLINIC | Age: 67
End: 2022-01-04
Payer: MEDICARE

## 2022-01-04 VITALS
HEART RATE: 84 BPM | WEIGHT: 138.69 LBS | BODY MASS INDEX: 23.68 KG/M2 | SYSTOLIC BLOOD PRESSURE: 146 MMHG | HEIGHT: 64 IN | DIASTOLIC BLOOD PRESSURE: 84 MMHG

## 2022-01-04 DIAGNOSIS — G60.9 IDIOPATHIC PERIPHERAL NEUROPATHY: ICD-10-CM

## 2022-01-04 DIAGNOSIS — I73.00 RAYNAUD'S PHENOMENON WITHOUT GANGRENE: ICD-10-CM

## 2022-01-04 DIAGNOSIS — G60.9 IDIOPATHIC SMALL FIBER PERIPHERAL NEUROPATHY: Primary | ICD-10-CM

## 2022-01-04 PROCEDURE — 99214 OFFICE O/P EST MOD 30 MIN: CPT | Mod: S$GLB,,, | Performed by: PSYCHIATRY & NEUROLOGY

## 2022-01-04 PROCEDURE — 1159F PR MEDICATION LIST DOCUMENTED IN MEDICAL RECORD: ICD-10-PCS | Mod: CPTII,S$GLB,, | Performed by: PSYCHIATRY & NEUROLOGY

## 2022-01-04 PROCEDURE — 3077F SYST BP >= 140 MM HG: CPT | Mod: CPTII,S$GLB,, | Performed by: PSYCHIATRY & NEUROLOGY

## 2022-01-04 PROCEDURE — 1160F PR REVIEW ALL MEDS BY PRESCRIBER/CLIN PHARMACIST DOCUMENTED: ICD-10-PCS | Mod: CPTII,S$GLB,, | Performed by: PSYCHIATRY & NEUROLOGY

## 2022-01-04 PROCEDURE — 3079F PR MOST RECENT DIASTOLIC BLOOD PRESSURE 80-89 MM HG: ICD-10-PCS | Mod: CPTII,S$GLB,, | Performed by: PSYCHIATRY & NEUROLOGY

## 2022-01-04 PROCEDURE — 99214 PR OFFICE/OUTPT VISIT, EST, LEVL IV, 30-39 MIN: ICD-10-PCS | Mod: S$GLB,,, | Performed by: PSYCHIATRY & NEUROLOGY

## 2022-01-04 PROCEDURE — 99999 PR PBB SHADOW E&M-EST. PATIENT-LVL IV: CPT | Mod: PBBFAC,,, | Performed by: PSYCHIATRY & NEUROLOGY

## 2022-01-04 PROCEDURE — 99999 PR PBB SHADOW E&M-EST. PATIENT-LVL IV: ICD-10-PCS | Mod: PBBFAC,,, | Performed by: PSYCHIATRY & NEUROLOGY

## 2022-01-04 PROCEDURE — 3077F PR MOST RECENT SYSTOLIC BLOOD PRESSURE >= 140 MM HG: ICD-10-PCS | Mod: CPTII,S$GLB,, | Performed by: PSYCHIATRY & NEUROLOGY

## 2022-01-04 PROCEDURE — 3008F BODY MASS INDEX DOCD: CPT | Mod: CPTII,S$GLB,, | Performed by: PSYCHIATRY & NEUROLOGY

## 2022-01-04 PROCEDURE — 1160F RVW MEDS BY RX/DR IN RCRD: CPT | Mod: CPTII,S$GLB,, | Performed by: PSYCHIATRY & NEUROLOGY

## 2022-01-04 PROCEDURE — 3008F PR BODY MASS INDEX (BMI) DOCUMENTED: ICD-10-PCS | Mod: CPTII,S$GLB,, | Performed by: PSYCHIATRY & NEUROLOGY

## 2022-01-04 PROCEDURE — 1159F MED LIST DOCD IN RCRD: CPT | Mod: CPTII,S$GLB,, | Performed by: PSYCHIATRY & NEUROLOGY

## 2022-01-04 PROCEDURE — 3079F DIAST BP 80-89 MM HG: CPT | Mod: CPTII,S$GLB,, | Performed by: PSYCHIATRY & NEUROLOGY

## 2022-01-04 RX ORDER — GABAPENTIN 300 MG/1
900 CAPSULE ORAL 3 TIMES DAILY
Qty: 810 CAPSULE | Refills: 3 | Status: SHIPPED | OUTPATIENT
Start: 2022-01-04 | End: 2023-01-19 | Stop reason: SDUPTHER

## 2022-01-04 RX ORDER — DULOXETIN HYDROCHLORIDE 60 MG/1
60 CAPSULE, DELAYED RELEASE ORAL DAILY
Qty: 90 CAPSULE | Refills: 3 | Status: SHIPPED | OUTPATIENT
Start: 2022-01-04 | End: 2023-01-19 | Stop reason: SDUPTHER

## 2022-01-04 NOTE — PATIENT INSTRUCTIONS
Increase Gabapentin to 3 pills three times a day.    Continue Cymbalta.    Return to clinic in 8 weeks, will see if medication needs adjusted further.    If the Raynaud's really starts to bother you, recommend trying a calcium channel blocker such as cardizem.  This could be used in place of metoprolol to manage blood pressure and the color changes in your fingers and toes.

## 2022-01-04 NOTE — PROGRESS NOTES
NEUROLOGY  Outpatient Follow Up    [facility]  [address] [facility], [place] [address]  [phone] (office) / [phone] (fax)    Patient Name:  Michelle Denson  :  1955  MR #:  [id number]  Acct #:  [id number]    Date of Neurology Visit: 2022  Name of Neurologist: Heather Miller D.O, ABPN, AOBNP, ABEM    Other Physicians:  Julia Croft MD (Primary Care Physician); No ref. provider found (Referring)      Chief Complaint: Peripheral Neuropathy      History of Present Illness (HPI):  Michelle Denson is a 66 y.o. female here for follow up of neuropathy.    She states in Oct her neuropathy symptoms started getting worse. She had restarted her statin medications in July.  She feels this is the reason her neuropathy is worse.  She has since stopped her statin in Nov and feels the neuropathy maybe got a little better.  She has not spoken to her PCP about this yet.    She has been taking Gabapentin 3 pills in am, 2 in midday and 3 at night.  She has not always had to take 3 pills at midday.  She still feels this is helpful.  She is also still on Cymbalta.    The last few days her arms and chest have really been bothering her.  There is a lot of burning.    She had felt she was pretty stable for a while, but is upset by the increased intensity lately.    She has had episodes where her right ring finger turns white and ice cold. She also has a toe that does it.  She has not noticed it in her feet as much.      Interval history:  21  She states she was doing great and doing a lot of walking.  She developed some bursitis in the left hip.  At night she still has a lot of burning in her feet and her lips will burn during the day.  She states they are burning right now.  She states now she notices when it stops.    She states she is getting a vibrating sensation in her left foot and upper thigh.  The foot has been going on for years, but the thigh has only been in the last couple of  days.  She has no back pain. She has also had this vibration sensation on the nose.  She still gets some burning in the hands, but not like they used to.  The hands have been better than they were three years ago.  She is doing okay with the three times a day dosing.   She was worried about being on a high dose.     6/30/20  She tolerated the increased Gabapentin.  The intensity is down.  She feels she is finally getting some relief.  She can tell if she misses a dose.  She is tolerating the Gabapentin well.    She cannot go a whole day without symptoms yet.  She feels worse at night.  Her feet will get puffy, red and burning.  Her lips also bother her.    This is the best she has been in 2 years. She is even walking her dog again.      3/3/20  She initially thought Cymbalta was working, but when the weather started to get more hot and humid her pain came back.    She states the last few days it has become worse again.  She feels it is in more locations as well.  She states the pain feels like a bad sunburn on her arms and ears.  Her lips feel like she has spicy food stuck on her lips.  Her feet feel like she is walking on hot concrete.  She can also feel like someone is clawing her arms.  She also feels a buzzing sensation, often coming on before the pain.  Gabapentin 100mg is currently 1 in am, 2 at noon, 2 at dinner and 3 at night.  Her pain seems to escalate in the afternoon.  Cymbalta is at 60mg for the past few weeks.  She has no changes in bowel or bladder function.  She is not dizzy or passing out.  She has normal appetite and does not have digestive issues.  She does not have abnormal sweating.  She has felt like she had something in the back of her throat, but the ENT said it was GERD.  It did go away after taking medication for that.    12/13/19  Michelle Denson is a 64 y.o. female referred for consultation regarding small fiber neuropathy.  Patient states that she has been having painful burning  sensations for 2 years.  She states it started in her feet as tingling, that seemed to come on suddenly.  She states from there she had cold sensations in the feet.  She had vascular studies and saw cardiology and these were normal. She then started having burning in the feet, hands and lips.  She feels it is traveling up the arms now.  She states it would feel like a sunburn pain or a clawing pain.  She states about 2 weeks into this, she had some terrible mouth sores.  She states it took weeks for them to clear, causing her to lose some weight.  She was seen by Dr. Hoffmann in [place] first.  She was then seen by Dr. Martinez.  They feel they have run out of things to test for so thought she would come for another opinion.  There was no preceding illness.  She had been on a multivitamin.  She added B complex after the neuropathy symptoms started.  She since stopped because her B6 was high. She stopped her simvastatin though she had been on it for a few years.  She has no digestion issues.  She has no diarrhea or constipation.  She had no previous surgery.  She has no abnormal sweating.  She has no vision changes.  She has no problems with dizziness or passing out.  She has not noted a change in gait or increase in falls.  She has had bloodwork.  She had an MRI of her brain and spine.  She had an ultrasound of her legs.  She had an EMG and was told this was fine.  Skin biopsy.    She has had a colonoscopy in the past, this was fine.     Treatment to date:   B12  Cymbalta 60mg/day - helps  Gabapentin 900mg/600mg/900mg (but has tried 900 TID)  Horizant - made her wired and she could not sleep  Amitriptyline - side effects  Nortriptyline - side effects  Lyrica - vertigo  Lexapro - no help    Review of Systems:    General: Weight gain: Yes, Weight Loss: No, Fatigue: No,   Fever: No, Chills: No, Night Sweats: No, Insomnia: No, Excessive sleeping: No   Respiratory:  Cough: No, Shortness of Breath: No,   Wheezing: No,  Excessive Snoring: No, Coughing up blood: No  Endocrine: Heat Intolerance: No, Cold Intolerance: No,   Excessive Thirst: No, Excessive Hunger: No,   Eyes:  Blurred Vision: No, Double Vision: No,   Light Sensitivity: No, Eye pain: No  Musculoskeletal: Muscle Aches/Pain: No, Joint Pain/Swelling: No, Muscle Cramps: No, Muscle Weakness: No, Neck Pain: No, Back Pain: No   Neurological: Difficulty Walking/Falls: No, Headache Migraine: No, Dizziness/Vertigo: No, Fainting: No, Difficulty with Speech: No, Weakness: No, Tingling/Numbness: Yes, Tremors: No, Memory Problems: No, Seizures: No, Difficulty Swallowing: No, Altered Taste: No.  Cardiovascular: Chest Pain: No, Shortness of Breath: No,   Palpitations: No,  Gastrointestinal: Nausea/Vomiting: No, Constipation: No, Diarrhea: No, Bloody Stools: No   Psych/Cog:  Depression: No, Anxiety: No, Hallucinations: No, Problems Concentrating: No  : Frequent Urination: No, Incontinence: No, Urinary Infections: No, Blood of Urine: No  ENT:Hearing Loss: No, Earache: No, Ringing in Ears: No,   Facial Pain: No, Chronic Congestion: No   Immune: Seasonal Allergies: No, Hives and/or Rashes: No  The remainder of the review of twelve body systems was reviewed and normal.    Past Medical, Surgical, Family & Social History:   Reviewed, verified and updated as applicable.    Home Medications:     Current Outpatient Medications:     aspirin (ECOTRIN) 81 MG EC tablet, Take 81 mg by mouth., Disp: , Rfl:     cyanocobalamin (VITAMIN B-12) 1000 MCG tablet, Take 100 mcg by mouth once daily., Disp: , Rfl:     DULoxetine (CYMBALTA) 60 MG capsule, Take 1 capsule (60 mg total) by mouth once daily., Disp: 90 capsule, Rfl: 3    ergocalciferol, vitamin D2, 400 unit Tab, Take by mouth., Disp: , Rfl:     gabapentin (NEURONTIN) 300 MG capsule, Take 3 capsules (900 mg total) by mouth 3 (three) times daily., Disp: 810 capsule, Rfl: 3    levothyroxine (SYNTHROID) 50 MCG tablet, levothyroxine 50 mcg tablet   "Take 1 tablet every day by oral route., Disp: , Rfl:     LORazepam (ATIVAN) 0.5 MG tablet, Take 0.5 mg by mouth every evening., Disp: , Rfl:     melatonin 5 mg Tab, Take by mouth., Disp: , Rfl:     metoprolol succinate (TOPROL-XL) 25 MG 24 hr tablet, Take 12.5 mg by mouth., Disp: , Rfl:     NON FORMULARY MEDICATION, Take by mouth once daily., Disp: , Rfl:     Physical Examination:  Reviewed, verified and updated as applicable.    BP (!) 146/84 (BP Location: Left arm, Patient Position: Sitting, BP Method: Small (Automatic))   Pulse 84   Ht 5' 4" (1.626 m)   Wt 62.9 kg (138 lb 10.7 oz)   BMI 23.80 kg/m²     GENERAL:  General appearance: Well, non-toxic appearing.  No apparent distress.  Extremities: normal.    MENTAL STATUS:  Alertness, attention span & concentration: normal.  Language: normal.  Orientation to self, place & time:  normal.  Memory, recent & remote: normal.  Fund of knowledge: normal.     SPEECH:  Clear and fluent.  Follows complex commands.     CRANIAL NERVES:  Cranial Nerves II-XII were examined.  II - Visual fields: normal.  III, IV, VI: PERRL, EOMI, No ptosis, No nystagmus.  V - Facial sensation: normal.  VII - Face symmetry & mobility: normal.  VIII - Hearing: normal.  IX, X - Palate: mobile & midline.  XI - Shoulder shrug: normal.  XII - Tongue protrusion: normal.     GROSS MOTOR:  Gait & station: normal.  Tone: normal.  Abnormal movements: none.  Finger-nose & Heel-knee-shin: normal.  Rapid alternating movements & drift: normal.  Romberg: absent     MUSCLE STRENGTH:      Fascics Atrophy RIGHT      LEFT Atrophy Fascics       5 Neck Ext. 5           5 Neck Flex 5           5 Deltoids 5           5 Sh.Ext.Rot. 5           5 Sh.Int.Rot. 5           5 Biceps 5           5 Triceps 5           5 Forearm.Pr. 5           5 Wrist Ext. 5           5 Wrist Flex 5           5 Finger Ext. 5           5 Finger Flex 5           5 FPL 5           5 Inteross. 5                                           5 " Iliopsoas 5           5 Hip Abduct 5           5 Hip Adduct 5           5 Quads 5           5 Hams 5           5 Dorsiflex 5           5 Plantar Flex 5           5 Ankle Oleksandr 5           5 Ankle Invert 5           5 Toe Ext. 5           5 Toe Flex 5                                          REFLEXES:     RIGHT Reflex    LEFT   2+ Biceps 2+   2+ Brachiorad. 2+   2+ Triceps 2+           2+ Patellar 2+   2+ Ankle 2+           Down PLANTAR Down      SENSORY:  Light touch: Normal throughout.  Sharp touch: Normal throughout.     Diagnostic Data Reviewed:   Records from Dr. Martinez's office were reviewed.  Patient was seen for small fiber neuropathy.  Labs were completed.  Skin biopsy was completed.  Patient was using neuropathic pain medications for management of symptoms.  Referred for second opinion.     Lab results available today reveal KAREN, Free Kappa, Free Lambda, SSA and SSB were negative.     EMG from Dr. Hoffmann will be requested.  Labs from Dr. Martinez will be requested.     Patient reports Lyme was neg.  HIV was tested and neg.  Colonoscopy was neg.     Skin biopsy received.  Performed 3/20/18.  Left thigh:  Skin with low normal nerve fiber density.  Left calf:  Skin with normal fiber density.  Test is suggestive, but not diagnostic of small fiber neuropathy.    Labs reviewed:  SPEP is normal  Kappa and Lambda light chains normal.  SSA, SSB normal  B6 normal  TTG normal  MMA normla  Copper normal  A1c normal  ACE normal  Hep C normal  B1 normal      Component      Latest Ref Rng & Units 3/3/2020   Arsenic      0 - 12 ng/mL <1   Lead      0.0 - 4.9 mcg/dL <1.0   Cadmium      0.0 - 4.9 ng/mL 0.2   Mercury      0 - 9 ng/mL <1   Venous/Capillary       Venous   Hepatitis B Surface Ag      Negative Negative   Hep B C IgM      Negative Negative   Hep A IgM      Negative Negative   Hepatitis C Ab      Negative Negative   Anti Sm Antibody      0.00 - 0.99 Ratio 0.07   Anti-Sm Interpretation      Negative Negative   CRP       0.0 - 8.2 mg/L 0.8   Cryoglobulin, Qual      Absent Absent     Assessment and Plan:  Michelle Denson is a 64 y.o., right handed woman with a history of painful peripheral neuropathy believed to be small fiber neuropathy.  Based on her current workup, this appears to be idiopathic.  Her previous workup and additional workup in this clinic was normal.       She will continue Cymbalta 60mg daily.  She will increase gabapentin to 3 pills 3 times a day, for a total of 2700 mg a day.  We reviewed their other medication options such as Trileptal and even topical agents.    Unclear of relationship between statin medication and small fiber neuropathy.  There have been reports of axonal neuropathy associated with statins.  Consider using non-HMGCoA reductase agents to lower cholesterol given her sense of consistently worsening neuropathy on these medications.       If the Raynaud's really starts to bother her, recommend trying a calcium channel blocker such as cardizem.  This could be used in place of metoprolol to manage blood pressure and the color changes in your fingers and toes.  Advise discussing this with her PCP.  She may also need a more extensive rheumatology assessment as this is frequently associated with inflammatory syndromes such as Sjogrens or Lupus.    The patient will return to clinic in 2 months to follow up on her medications for management of neuropathic pain.    Important to note, also  has a past medical history of Hypertension, Neurocutaneous syndrome, and Neuropathy.     Time Spent: I spent a total of 33 minutes on the day of the visit.This includes face to face time and non-face to face time preparing to see the patient (eg, review of tests), Obtaining and/or reviewing separately obtained history, Documenting clinical information in the electronic or other health record, Independently interpreting resultsand communicating results to the patient/family/caregiver, or Care coordination.     This note  was created with voice recognition software.  Grammatical, syntax and spelling errors may be inevitable.      Heather Miller D.O, ABPN, AOBNP, ABEM

## 2023-01-19 ENCOUNTER — OFFICE VISIT (OUTPATIENT)
Dept: NEUROLOGY | Facility: CLINIC | Age: 68
End: 2023-01-19
Payer: MEDICARE

## 2023-01-19 VITALS
HEART RATE: 92 BPM | DIASTOLIC BLOOD PRESSURE: 83 MMHG | RESPIRATION RATE: 17 BRPM | WEIGHT: 127 LBS | BODY MASS INDEX: 21.68 KG/M2 | HEIGHT: 64 IN | SYSTOLIC BLOOD PRESSURE: 131 MMHG

## 2023-01-19 DIAGNOSIS — G60.9 IDIOPATHIC PERIPHERAL NEUROPATHY: ICD-10-CM

## 2023-01-19 DIAGNOSIS — G60.9 IDIOPATHIC SMALL FIBER PERIPHERAL NEUROPATHY: ICD-10-CM

## 2023-01-19 PROCEDURE — 99213 PR OFFICE/OUTPT VISIT, EST, LEVL III, 20-29 MIN: ICD-10-PCS | Mod: S$GLB,,, | Performed by: PSYCHIATRY & NEUROLOGY

## 2023-01-19 PROCEDURE — 99999 PR PBB SHADOW E&M-EST. PATIENT-LVL IV: ICD-10-PCS | Mod: PBBFAC,,, | Performed by: PSYCHIATRY & NEUROLOGY

## 2023-01-19 PROCEDURE — 3079F PR MOST RECENT DIASTOLIC BLOOD PRESSURE 80-89 MM HG: ICD-10-PCS | Mod: CPTII,S$GLB,, | Performed by: PSYCHIATRY & NEUROLOGY

## 2023-01-19 PROCEDURE — 3008F BODY MASS INDEX DOCD: CPT | Mod: CPTII,S$GLB,, | Performed by: PSYCHIATRY & NEUROLOGY

## 2023-01-19 PROCEDURE — 1160F PR REVIEW ALL MEDS BY PRESCRIBER/CLIN PHARMACIST DOCUMENTED: ICD-10-PCS | Mod: CPTII,S$GLB,, | Performed by: PSYCHIATRY & NEUROLOGY

## 2023-01-19 PROCEDURE — 99999 PR PBB SHADOW E&M-EST. PATIENT-LVL IV: CPT | Mod: PBBFAC,,, | Performed by: PSYCHIATRY & NEUROLOGY

## 2023-01-19 PROCEDURE — 1159F MED LIST DOCD IN RCRD: CPT | Mod: CPTII,S$GLB,, | Performed by: PSYCHIATRY & NEUROLOGY

## 2023-01-19 PROCEDURE — 99213 OFFICE O/P EST LOW 20 MIN: CPT | Mod: S$GLB,,, | Performed by: PSYCHIATRY & NEUROLOGY

## 2023-01-19 PROCEDURE — 3288F FALL RISK ASSESSMENT DOCD: CPT | Mod: CPTII,S$GLB,, | Performed by: PSYCHIATRY & NEUROLOGY

## 2023-01-19 PROCEDURE — 1125F AMNT PAIN NOTED PAIN PRSNT: CPT | Mod: CPTII,S$GLB,, | Performed by: PSYCHIATRY & NEUROLOGY

## 2023-01-19 PROCEDURE — 3079F DIAST BP 80-89 MM HG: CPT | Mod: CPTII,S$GLB,, | Performed by: PSYCHIATRY & NEUROLOGY

## 2023-01-19 PROCEDURE — 1101F PT FALLS ASSESS-DOCD LE1/YR: CPT | Mod: CPTII,S$GLB,, | Performed by: PSYCHIATRY & NEUROLOGY

## 2023-01-19 PROCEDURE — 1159F PR MEDICATION LIST DOCUMENTED IN MEDICAL RECORD: ICD-10-PCS | Mod: CPTII,S$GLB,, | Performed by: PSYCHIATRY & NEUROLOGY

## 2023-01-19 PROCEDURE — 1101F PR PT FALLS ASSESS DOC 0-1 FALLS W/OUT INJ PAST YR: ICD-10-PCS | Mod: CPTII,S$GLB,, | Performed by: PSYCHIATRY & NEUROLOGY

## 2023-01-19 PROCEDURE — 3075F SYST BP GE 130 - 139MM HG: CPT | Mod: CPTII,S$GLB,, | Performed by: PSYCHIATRY & NEUROLOGY

## 2023-01-19 PROCEDURE — 1160F RVW MEDS BY RX/DR IN RCRD: CPT | Mod: CPTII,S$GLB,, | Performed by: PSYCHIATRY & NEUROLOGY

## 2023-01-19 PROCEDURE — 1125F PR PAIN SEVERITY QUANTIFIED, PAIN PRESENT: ICD-10-PCS | Mod: CPTII,S$GLB,, | Performed by: PSYCHIATRY & NEUROLOGY

## 2023-01-19 PROCEDURE — 3075F PR MOST RECENT SYSTOLIC BLOOD PRESS GE 130-139MM HG: ICD-10-PCS | Mod: CPTII,S$GLB,, | Performed by: PSYCHIATRY & NEUROLOGY

## 2023-01-19 PROCEDURE — 3008F PR BODY MASS INDEX (BMI) DOCUMENTED: ICD-10-PCS | Mod: CPTII,S$GLB,, | Performed by: PSYCHIATRY & NEUROLOGY

## 2023-01-19 PROCEDURE — 3288F PR FALLS RISK ASSESSMENT DOCUMENTED: ICD-10-PCS | Mod: CPTII,S$GLB,, | Performed by: PSYCHIATRY & NEUROLOGY

## 2023-01-19 RX ORDER — DULOXETIN HYDROCHLORIDE 60 MG/1
60 CAPSULE, DELAYED RELEASE ORAL DAILY
Qty: 90 CAPSULE | Refills: 3 | Status: SHIPPED | OUTPATIENT
Start: 2023-01-19 | End: 2024-01-29 | Stop reason: SDUPTHER

## 2023-01-19 RX ORDER — GABAPENTIN 300 MG/1
900 CAPSULE ORAL 3 TIMES DAILY
Qty: 810 CAPSULE | Refills: 3 | Status: SHIPPED | OUTPATIENT
Start: 2023-01-19 | End: 2024-01-29 | Stop reason: SDUPTHER

## 2023-01-19 RX ORDER — LEVOTHYROXINE SODIUM 50 UG/1
1 TABLET ORAL DAILY
COMMUNITY
Start: 2022-06-01

## 2023-01-19 RX ORDER — LORAZEPAM 1 MG/1
1 TABLET ORAL EVERY 8 HOURS PRN
COMMUNITY
Start: 2023-01-04

## 2023-01-19 NOTE — PROGRESS NOTES
NEUROLOGY  Outpatient Follow Up    [facility]  [address] [facility], [place] [address]  [phone] (office) / [phone] (fax)    Patient Name:  Michelle Denson  :  1955  MR #:  [id number]  Acct #:  [id number]    Date of Neurology Visit: 2023  Name of Neurologist: Heather Miller D.O, ABPN, AOBNP, ABEM    Other Physicians:  Julia Croft MD (Primary Care Physician); No ref. provider found (Referring)      Chief Complaint: Peripheral Neuropathy (Starts in afternoon)      History of Present Illness (HPI):  Michelle Denson is a 67 y.o. female here for follow up of neuropathy.    She states nothing has changed.  Some days are good.  Some days are bad.  She can have bad days where her arms and nose will burn, but most days are her feet with occasional addition of her lips.    She still has the color changes in her fingers.  She had seen rheumatology in the past.  They did not find anything, but she does not think anyone has looked at her since she developed the color change in the fingers when she is cold.      She has no weakness.  She feels very strong.    She is on Gabapentin 900mg TID and Cymbalta 60mg.      She does yoga 3 times a week.  She has been consistent for almost a year.  She also gardens.  She denies any falls.      No lightheaded or dizziness.  No digestive issues.  No bowel or bladder issues.  No abnormal sweating.      Interval history:  22:  She states in Oct her neuropathy symptoms started getting worse. She had restarted her statin medications in July.  She feels this is the reason her neuropathy is worse.  She has since stopped her statin in Nov and feels the neuropathy maybe got a little better.  She has not spoken to her PCP about this yet.  She has been taking Gabapentin 3 pills in am, 2 in midday and 3 at night.  She has not always had to take 3 pills at midday.  She still feels this is helpful.  She is also still on Cymbalta.  The last few days her  arms and chest have really been bothering her.  There is a lot of burning.  She had felt she was pretty stable for a while, but is upset by the increased intensity lately.  She has had episodes where her right ring finger turns white and ice cold. She also has a toe that does it.  She has not noticed it in her feet as much.    1/21/21  She states she was doing great and doing a lot of walking.  She developed some bursitis in the left hip.  At night she still has a lot of burning in her feet and her lips will burn during the day.  She states they are burning right now.  She states now she notices when it stops.    She states she is getting a vibrating sensation in her left foot and upper thigh.  The foot has been going on for years, but the thigh has only been in the last couple of days.  She has no back pain. She has also had this vibration sensation on the nose.  She still gets some burning in the hands, but not like they used to.  The hands have been better than they were three years ago.  She is doing okay with the three times a day dosing.   She was worried about being on a high dose.     6/30/20  She tolerated the increased Gabapentin.  The intensity is down.  She feels she is finally getting some relief.  She can tell if she misses a dose.  She is tolerating the Gabapentin well.    She cannot go a whole day without symptoms yet.  She feels worse at night.  Her feet will get puffy, red and burning.  Her lips also bother her.    This is the best she has been in 2 years. She is even walking her dog again.      3/3/20  She initially thought Cymbalta was working, but when the weather started to get more hot and humid her pain came back.    She states the last few days it has become worse again.  She feels it is in more locations as well.  She states the pain feels like a bad sunburn on her arms and ears.  Her lips feel like she has spicy food stuck on her lips.  Her feet feel like she is walking on hot concrete.   She can also feel like someone is clawing her arms.  She also feels a buzzing sensation, often coming on before the pain.  Gabapentin 100mg is currently 1 in am, 2 at noon, 2 at dinner and 3 at night.  Her pain seems to escalate in the afternoon.  Cymbalta is at 60mg for the past few weeks.  She has no changes in bowel or bladder function.  She is not dizzy or passing out.  She has normal appetite and does not have digestive issues.  She does not have abnormal sweating.  She has felt like she had something in the back of her throat, but the ENT said it was GERD.  It did go away after taking medication for that.    12/13/19  Michelle Denson is a 64 y.o. female referred for consultation regarding small fiber neuropathy.  Patient states that she has been having painful burning sensations for 2 years.  She states it started in her feet as tingling, that seemed to come on suddenly.  She states from there she had cold sensations in the feet.  She had vascular studies and saw cardiology and these were normal. She then started having burning in the feet, hands and lips.  She feels it is traveling up the arms now.  She states it would feel like a sunburn pain or a clawing pain.  She states about 2 weeks into this, she had some terrible mouth sores.  She states it took weeks for them to clear, causing her to lose some weight.  She was seen by Dr. Hoffmann in [place] first.  She was then seen by Dr. Martinez.  They feel they have run out of things to test for so thought she would come for another opinion.  There was no preceding illness.  She had been on a multivitamin.  She added B complex after the neuropathy symptoms started.  She since stopped because her B6 was high. She stopped her simvastatin though she had been on it for a few years.  She has no digestion issues.  She has no diarrhea or constipation.  She had no previous surgery.  She has no abnormal sweating.  She has no vision changes.  She has no problems with dizziness  or passing out.  She has not noted a change in gait or increase in falls.  She has had bloodwork.  She had an MRI of her brain and spine.  She had an ultrasound of her legs.  She had an EMG and was told this was fine.  Skin biopsy.    She has had a colonoscopy in the past, this was fine.     Treatment to date:   B12  Cymbalta 60mg/day - helps  Gabapentin - 900 TID  Horizant - made her wired and she could not sleep  Amitriptyline - side effects  Nortriptyline - side effects  Lyrica - vertigo  Lexapro - no help    Review of Systems:    General: Weight gain: No, Weight Loss: No, Fatigue: No,   Fever: No, Chills: No, Night Sweats: No, Insomnia: No, Excessive sleeping: No   Respiratory:  Cough: No, Shortness of Breath: No,   Wheezing: No, Excessive Snoring: No, Coughing up blood: No  Endocrine: Heat Intolerance: No, Cold Intolerance: No,   Excessive Thirst: No, Excessive Hunger: No,   Eyes:  Blurred Vision: No, Double Vision: No,   Light Sensitivity: No, Eye pain: No  Musculoskeletal: Muscle Aches/Pain: No, Joint Pain/Swelling: No, Muscle Cramps: No, Muscle Weakness: No, Neck Pain: No, Back Pain: No   Neurological: Difficulty Walking/Falls: No, Headache Migraine: No, Dizziness/Vertigo: No, Fainting: No, Difficulty with Speech: No, Weakness: No, Tingling/Numbness: No, Tremors: No, Memory Problems: No, Seizures: No, Difficulty Swallowing: No, Altered Taste: No.  Cardiovascular: Chest Pain: No, Shortness of Breath: No,   Palpitations: No,  Gastrointestinal: Nausea/Vomiting: No, Constipation: No, Diarrhea: No, Bloody Stools: No   Psych/Cog:  Depression: No, Anxiety: No, Hallucinations: No, Problems Concentrating: No  : Frequent Urination: No, Incontinence: No, Urinary Infections: No, Blood of Urine: No  ENT:Hearing Loss: No, Earache: No, Ringing in Ears: No,   Facial Pain: No, Chronic Congestion: No   Immune: Seasonal Allergies: No, Hives and/or Rashes: No  The remainder of the review of twelve body systems was reviewed  "and normal.      Past Medical, Surgical, Family & Social History:   Reviewed, verified and updated as applicable.    Home Medications:     Current Outpatient Medications:     aspirin (ECOTRIN) 81 MG EC tablet, Take 81 mg by mouth., Disp: , Rfl:     cyanocobalamin (VITAMIN B-12) 1000 MCG tablet, Take 100 mcg by mouth once daily., Disp: , Rfl:     ergocalciferol, vitamin D2, 400 unit Tab, Take by mouth., Disp: , Rfl:     levothyroxine (SYNTHROID) 50 MCG tablet, levothyroxine 50 mcg tablet  Take 1 tablet every day by oral route., Disp: , Rfl:     LORazepam (ATIVAN) 0.5 MG tablet, Take 0.5 mg by mouth every evening., Disp: , Rfl:     melatonin 5 mg Tab, Take by mouth., Disp: , Rfl:     metoprolol succinate (TOPROL-XL) 25 MG 24 hr tablet, Take 12.5 mg by mouth., Disp: , Rfl:     NON FORMULARY MEDICATION, Take by mouth once daily., Disp: , Rfl:     DULoxetine (CYMBALTA) 60 MG capsule, Take 1 capsule (60 mg total) by mouth once daily., Disp: 90 capsule, Rfl: 3    gabapentin (NEURONTIN) 300 MG capsule, Take 3 capsules (900 mg total) by mouth 3 (three) times daily., Disp: 810 capsule, Rfl: 3    levothyroxine (SYNTHROID) 50 MCG tablet, Take 1 tablet by mouth once daily., Disp: , Rfl:     LORazepam (ATIVAN) 1 MG tablet, Take 1 mg by mouth every 8 (eight) hours as needed., Disp: , Rfl:     Physical Examination:  Reviewed, verified and updated as applicable.    /83 (BP Location: Right arm, Patient Position: Sitting, BP Method: Medium (Automatic))   Pulse 92   Resp 17   Ht 5' 4" (1.626 m)   Wt 57.6 kg (126 lb 15.8 oz)   BMI 21.80 kg/m²     GENERAL:  General appearance: Well, non-toxic appearing.  No apparent distress.  Extremities: normal.    MENTAL STATUS:  Alertness, attention span & concentration: normal.  Language: normal.  Orientation to self, place & time:  normal.  Memory, recent & remote: normal.  Fund of knowledge: normal.     SPEECH:  Clear and fluent.  Follows complex commands.     CRANIAL NERVES:  Cranial " Nerves II-XII were examined.  II - Visual fields: normal.  III, IV, VI: PERRL, EOMI, No ptosis, No nystagmus.  V - Facial sensation: normal.  VII - Face symmetry & mobility: normal.  VIII - Hearing: normal.  IX, X - Palate: mobile & midline.  XI - Shoulder shrug: normal.  XII - Tongue protrusion: normal.     GROSS MOTOR:  Gait & station: normal.  Tone: normal.  Abnormal movements: none.       MUSCLE STRENGTH:      Fascics Atrophy RIGHT    LEFT Atrophy Fascics     5 Deltoids 5       5 Biceps 5       5 Triceps 5       5 Forearm.Pr. 5       5 Inteross. 5                         5 Iliopsoas 5       5 Quads 5       5 Hams 5       5 Dorsiflex 5       5 Plantar Flex 5          REFLEXES:     RIGHT Reflex    LEFT   2+ Biceps 2+   2+ Brachiorad. 2+   2+ Triceps 2+           2+ Patellar 2+   2+ Ankle 2+           Down PLANTAR Down      SENSORY:  Vibration sense: Normal throughout.  Sharp touch: Normal throughout.       Diagnostic Data Reviewed:   Records from Dr. Martinez's office were reviewed.  Patient was seen for small fiber neuropathy.  Labs were completed.  Skin biopsy was completed.  Patient was using neuropathic pain medications for management of symptoms.  Referred for second opinion.     Lab results available today reveal KAREN, Free Kappa, Free Lambda, SSA and SSB were negative.     EMG from Dr. Hoffmann will be requested.  Labs from Dr. Martinez will be requested.     Patient reports Lyme was neg.  HIV was tested and neg.  Colonoscopy was neg.     Skin biopsy received.  Performed 3/20/18.  Left thigh:  Skin with low normal nerve fiber density.  Left calf:  Skin with normal fiber density.  Test is suggestive, but not diagnostic of small fiber neuropathy.    Labs reviewed:  SPEP is normal  Kappa and Lambda light chains normal.  SSA, SSB normal  B6 normal  TTG normal  MMA normla  Copper normal  A1c normal  ACE normal  Hep C normal  B1 normal      Component      Latest Ref Rng & Units 3/3/2020   Arsenic      0 - 12 ng/mL <1    Lead      0.0 - 4.9 mcg/dL <1.0   Cadmium      0.0 - 4.9 ng/mL 0.2   Mercury      0 - 9 ng/mL <1   Venous/Capillary       Venous   Hepatitis B Surface Ag      Negative Negative   Hep B C IgM      Negative Negative   Hep A IgM      Negative Negative   Hepatitis C Ab      Negative Negative   Anti Sm Antibody      0.00 - 0.99 Ratio 0.07   Anti-Sm Interpretation      Negative Negative   CRP      0.0 - 8.2 mg/L 0.8   Cryoglobulin, Qual      Absent Absent     Assessment and Plan:  Michelle Denson is a 64 y.o., right handed woman with a history of painful peripheral neuropathy believed to be small fiber neuropathy.  Based on her current workup, this appears to be idiopathic.  Her previous workup and additional workup in this clinic was normal.       She will continue Cymbalta 60mg daily.  She will increase gabapentin to 3 pills 3 times a day, for a total of 2700 mg a day.  We reviewed their other medication options such as Trileptal and even topical agents.    Unclear of relationship between statin medication and small fiber neuropathy.  There have been reports of axonal neuropathy associated with statins.  Consider using non-HMGCoA reductase agents to lower cholesterol given her sense of consistently worsening neuropathy on these medications.       If the Raynaud's really starts to bother her, recommend trying a calcium channel blocker such as cardizem.  This could be used in place of metoprolol to manage blood pressure and the color changes in your fingers and toes.  Advise discussing this with her PCP.  She may also need a more extensive rheumatology assessment as this is frequently associated with inflammatory syndromes such as Sjogrens or Lupus.    Information on patient AVS:  Your Gabapentin and Duloxetine have been renewed. At this point, nothing new has become available for management of small fiber neuropathy pain.    Continue regular physical activity.  Exam continues to be excellent.  No evidence of anything  else involving the nervous system.      The patient will return to clinic in 2 months to follow up on her medications for management of neuropathic pain.    Important to note, also  has a past medical history of Hypertension, Neurocutaneous syndrome, and Neuropathy.     Time Spent: I spent a total of 22 minutes on the day of the visit.This includes face to face time and non-face to face time preparing to see the patient (eg, review of tests), Obtaining and/or reviewing separately obtained history, Documenting clinical information in the electronic or other health record, Independently interpreting resultsand communicating results to the patient/family/caregiver, or Care coordination.     This note was created with voice recognition software.  Grammatical, syntax and spelling errors may be inevitable.      Heather Miller D.O, ABPN, AOBNP, ABEM

## 2023-01-19 NOTE — PATIENT INSTRUCTIONS
Your Gabapentin and Duloxetine have been renewed. At this point, nothing new has become available for management of small fiber neuropathy pain.      Continue regular physical activity.    Exam continues to be excellent.  No evidence of anything else involving the nervous system.

## 2023-07-07 DIAGNOSIS — M25.561 RIGHT KNEE PAIN, UNSPECIFIED CHRONICITY: Primary | ICD-10-CM

## 2023-07-12 ENCOUNTER — OFFICE VISIT (OUTPATIENT)
Dept: ORTHOPEDICS | Facility: CLINIC | Age: 68
End: 2023-07-12
Payer: MEDICARE

## 2023-07-12 ENCOUNTER — HOSPITAL ENCOUNTER (OUTPATIENT)
Dept: RADIOLOGY | Facility: HOSPITAL | Age: 68
Discharge: HOME | End: 2023-07-12
Attending: ORTHOPAEDIC SURGERY
Payer: MEDICARE

## 2023-07-12 VITALS — BODY MASS INDEX: 21.51 KG/M2 | HEIGHT: 64 IN | RESPIRATION RATE: 18 BRPM | WEIGHT: 126 LBS

## 2023-07-12 DIAGNOSIS — M71.21 BAKER'S CYST OF KNEE, RIGHT: ICD-10-CM

## 2023-07-12 DIAGNOSIS — M25.561 RIGHT KNEE PAIN, UNSPECIFIED CHRONICITY: ICD-10-CM

## 2023-07-12 DIAGNOSIS — S83.241A TEAR OF MEDIAL MENISCUS OF RIGHT KNEE, UNSPECIFIED TEAR TYPE, UNSPECIFIED WHETHER OLD OR CURRENT TEAR, INITIAL ENCOUNTER: Primary | ICD-10-CM

## 2023-07-12 DIAGNOSIS — M17.10 ARTHRITIS OF KNEE: Primary | ICD-10-CM

## 2023-07-12 PROCEDURE — 1160F PR REVIEW ALL MEDS BY PRESCRIBER/CLIN PHARMACIST DOCUMENTED: ICD-10-PCS | Mod: CPTII,S$GLB,, | Performed by: ORTHOPAEDIC SURGERY

## 2023-07-12 PROCEDURE — 99999 PR PBB SHADOW E&M-EST. PATIENT-LVL II: CPT | Mod: PBBFAC,,, | Performed by: ORTHOPAEDIC SURGERY

## 2023-07-12 PROCEDURE — 1159F MED LIST DOCD IN RCRD: CPT | Mod: CPTII,S$GLB,, | Performed by: ORTHOPAEDIC SURGERY

## 2023-07-12 PROCEDURE — 3008F BODY MASS INDEX DOCD: CPT | Mod: CPTII,S$GLB,, | Performed by: ORTHOPAEDIC SURGERY

## 2023-07-12 PROCEDURE — 99204 OFFICE O/P NEW MOD 45 MIN: CPT | Mod: S$GLB,,, | Performed by: ORTHOPAEDIC SURGERY

## 2023-07-12 PROCEDURE — 73564 XR KNEE ORTHO RIGHT WITH FLEXION: ICD-10-PCS | Mod: 26,RT,, | Performed by: RADIOLOGY

## 2023-07-12 PROCEDURE — 99999 PR PBB SHADOW E&M-EST. PATIENT-LVL II: ICD-10-PCS | Mod: PBBFAC,,, | Performed by: ORTHOPAEDIC SURGERY

## 2023-07-12 PROCEDURE — 73564 X-RAY EXAM KNEE 4 OR MORE: CPT | Mod: TC,PO,RT

## 2023-07-12 PROCEDURE — 99204 PR OFFICE/OUTPT VISIT, NEW, LEVL IV, 45-59 MIN: ICD-10-PCS | Mod: S$GLB,,, | Performed by: ORTHOPAEDIC SURGERY

## 2023-07-12 PROCEDURE — 73562 X-RAY EXAM OF KNEE 3: CPT | Mod: 26,LT,, | Performed by: RADIOLOGY

## 2023-07-12 PROCEDURE — 73564 X-RAY EXAM KNEE 4 OR MORE: CPT | Mod: 26,RT,, | Performed by: RADIOLOGY

## 2023-07-12 PROCEDURE — 73562 XR KNEE ORTHO RIGHT WITH FLEXION: ICD-10-PCS | Mod: 26,LT,, | Performed by: RADIOLOGY

## 2023-07-12 PROCEDURE — 3008F PR BODY MASS INDEX (BMI) DOCUMENTED: ICD-10-PCS | Mod: CPTII,S$GLB,, | Performed by: ORTHOPAEDIC SURGERY

## 2023-07-12 PROCEDURE — 1160F RVW MEDS BY RX/DR IN RCRD: CPT | Mod: CPTII,S$GLB,, | Performed by: ORTHOPAEDIC SURGERY

## 2023-07-12 PROCEDURE — 1159F PR MEDICATION LIST DOCUMENTED IN MEDICAL RECORD: ICD-10-PCS | Mod: CPTII,S$GLB,, | Performed by: ORTHOPAEDIC SURGERY

## 2023-07-12 NOTE — PROGRESS NOTES
Past Medical History:   Diagnosis Date    Hypertension     Neurocutaneous syndrome     Neuropathy        Past Surgical History:   Procedure Laterality Date    APPENDECTOMY      TONSILLECTOMY         Current Outpatient Medications   Medication Sig    aspirin (ECOTRIN) 81 MG EC tablet Take 81 mg by mouth.    cyanocobalamin (VITAMIN B-12) 1000 MCG tablet Take 100 mcg by mouth once daily.    DULoxetine (CYMBALTA) 60 MG capsule Take 1 capsule (60 mg total) by mouth once daily.    ergocalciferol, vitamin D2, 400 unit Tab Take by mouth.    gabapentin (NEURONTIN) 300 MG capsule Take 3 capsules (900 mg total) by mouth 3 (three) times daily.    levothyroxine (SYNTHROID) 50 MCG tablet levothyroxine 50 mcg tablet   Take 1 tablet every day by oral route.    levothyroxine (SYNTHROID) 50 MCG tablet Take 1 tablet by mouth once daily.    LORazepam (ATIVAN) 0.5 MG tablet Take 0.5 mg by mouth every evening.    LORazepam (ATIVAN) 1 MG tablet Take 1 mg by mouth every 8 (eight) hours as needed.    melatonin 5 mg Tab Take by mouth.    metoprolol succinate (TOPROL-XL) 25 MG 24 hr tablet Take 12.5 mg by mouth.    NON FORMULARY MEDICATION Take by mouth once daily.     No current facility-administered medications for this visit.       Review of patient's allergies indicates:   Allergen Reactions    Amitriptyline Other (See Comments)     Pt was very light headed.    Nortriptyline Other (See Comments)     DIZZY AND HEART RACE       History reviewed. No pertinent family history.    Social History     Socioeconomic History    Marital status:    Tobacco Use    Smoking status: Former     Types: Cigarettes    Smokeless tobacco: Never    Tobacco comments:     socially in college   Substance and Sexual Activity    Alcohol use: Not Currently     Comment: former beer drinker    Drug use: Never       Chief Complaint: No chief complaint on file.      History of present illness:  This is a 68-year-old female seen for several months of right knee  pain and swelling.  Patient was diagnosed with a Baker cyst previously.  She is had it aspirated twice now.  She had cortisone injection put in her knee both times, but had a significant allergic reaction the 2nd time.  The last time that it was aspirated was back in April and the swelling came back within 2 days.  Patient describes as a constant ache.  Makes it difficult to do her yoga and her gardening.  Pain is only in the back of her knee.      Review of Systems:    Constitution: Negative for chills, fever, and sweats.  Negative for unexplained weight loss.    HENT:  Negative for headaches and blurry vision.    Cardiovascular:Negative for chest pain or irregular heart beat. Negative for hypertension.    Respiratory:  Negative for cough and shortness of breath.    Gastrointestinal: Negative for abdominal pain, heartburn, melena, nausea, and vomitting.    Genitourinary:  Negative bladder incontinence and dysuria.    Musculoskeletal:  See HPI    Neurological: Negative for numbness.    Psychiatric/Behavioral: Negative for depression.  The patient is not nervous/anxious.      Endocrine: Negative for polyuria    Hematologic/Lymphatic: Negative for bleeding problem.  Does not bruise/bleed easily.    Skin: Negative for poor would healing and rash      Physical Examination:    Vital Signs:  There were no vitals filed for this visit.    There is no height or weight on file to calculate BMI.    This a well-developed, well nourished patient in no acute distress.  They are alert and oriented and cooperative to examination.  Pt. walks without an antalgic gait.      Examination of the right knee shows no rashes or erythema. There is a fairly large palpable Baker cyst with mild tenderness. Patient has full range of motion from 0-130°. Patient is nontender to palpation over lateral joint line and nontender to palpation over the medial joint line. Patient has a - Lachman exam, - anterior drawer exam, and - posterior drawer exam.  - Apley exam. Knee is stable to varus and valgus stress. 5 out of 5 motor strength. Palpable distal pulses. Intact light touch sensation. Negative Patellofemoral crepitus      X-rays:  X-rays of the right knee are ordered and reviewed which show some mild arthritic change     Assessment::  Right Moreland cyst   Possible meniscal tear causing increased knee swelling    Plan:  Reviewed the findings with her today.  I recommended an MRI to further evaluate her knee.  Something is continuing to make significant joint fluid in her knee which makes the Baker cyst reaccumulates so fast.  Follow-up after the MRI is completed to discuss treatment options.    All previous pertinent notes including ER visits, physical therapy visits, other orthopedic visits as well as other care for the same musculoskeletal problem were reviewed.  All pertinent lab values and previous imaging was reviewed pertinent to the current visit.    This note was created using [employer] voice recognition software that occasionally misinterpreted phrases or words.    Consult note is delivered via Epic messaging service.

## 2023-07-24 ENCOUNTER — HOSPITAL ENCOUNTER (OUTPATIENT)
Dept: RADIOLOGY | Facility: HOSPITAL | Age: 68
Discharge: HOME | End: 2023-07-24
Attending: ORTHOPAEDIC SURGERY
Payer: MEDICARE

## 2023-07-24 DIAGNOSIS — S83.241A TEAR OF MEDIAL MENISCUS OF RIGHT KNEE, UNSPECIFIED TEAR TYPE, UNSPECIFIED WHETHER OLD OR CURRENT TEAR, INITIAL ENCOUNTER: ICD-10-CM

## 2023-07-24 PROCEDURE — 73721 MRI JNT OF LWR EXTRE W/O DYE: CPT | Mod: TC,PO,RT

## 2023-07-24 PROCEDURE — 73721 MRI KNEE WITHOUT CONTRAST RIGHT: ICD-10-PCS | Mod: 26,RT,, | Performed by: RADIOLOGY

## 2023-07-24 PROCEDURE — 73721 MRI JNT OF LWR EXTRE W/O DYE: CPT | Mod: 26,RT,, | Performed by: RADIOLOGY

## 2023-07-26 ENCOUNTER — OFFICE VISIT (OUTPATIENT)
Dept: ORTHOPEDICS | Facility: CLINIC | Age: 68
End: 2023-07-26
Payer: MEDICARE

## 2023-07-26 VITALS — WEIGHT: 125 LBS | HEIGHT: 65 IN | RESPIRATION RATE: 18 BRPM | BODY MASS INDEX: 20.83 KG/M2

## 2023-07-26 DIAGNOSIS — M17.10 ARTHRITIS OF KNEE: Primary | ICD-10-CM

## 2023-07-26 DIAGNOSIS — M71.21 BAKER'S CYST OF KNEE, RIGHT: ICD-10-CM

## 2023-07-26 PROCEDURE — 1101F PR PT FALLS ASSESS DOC 0-1 FALLS W/OUT INJ PAST YR: ICD-10-PCS | Mod: CPTII,S$GLB,, | Performed by: ORTHOPAEDIC SURGERY

## 2023-07-26 PROCEDURE — 3288F FALL RISK ASSESSMENT DOCD: CPT | Mod: CPTII,S$GLB,, | Performed by: ORTHOPAEDIC SURGERY

## 2023-07-26 PROCEDURE — 3008F BODY MASS INDEX DOCD: CPT | Mod: CPTII,S$GLB,, | Performed by: ORTHOPAEDIC SURGERY

## 2023-07-26 PROCEDURE — 3288F PR FALLS RISK ASSESSMENT DOCUMENTED: ICD-10-PCS | Mod: CPTII,S$GLB,, | Performed by: ORTHOPAEDIC SURGERY

## 2023-07-26 PROCEDURE — 99999 PR PBB SHADOW E&M-EST. PATIENT-LVL III: CPT | Mod: PBBFAC,,, | Performed by: ORTHOPAEDIC SURGERY

## 2023-07-26 PROCEDURE — 99214 PR OFFICE/OUTPT VISIT, EST, LEVL IV, 30-39 MIN: ICD-10-PCS | Mod: S$GLB,,, | Performed by: ORTHOPAEDIC SURGERY

## 2023-07-26 PROCEDURE — 1101F PT FALLS ASSESS-DOCD LE1/YR: CPT | Mod: CPTII,S$GLB,, | Performed by: ORTHOPAEDIC SURGERY

## 2023-07-26 PROCEDURE — 1159F MED LIST DOCD IN RCRD: CPT | Mod: CPTII,S$GLB,, | Performed by: ORTHOPAEDIC SURGERY

## 2023-07-26 PROCEDURE — 1159F PR MEDICATION LIST DOCUMENTED IN MEDICAL RECORD: ICD-10-PCS | Mod: CPTII,S$GLB,, | Performed by: ORTHOPAEDIC SURGERY

## 2023-07-26 PROCEDURE — 99999 PR PBB SHADOW E&M-EST. PATIENT-LVL III: ICD-10-PCS | Mod: PBBFAC,,, | Performed by: ORTHOPAEDIC SURGERY

## 2023-07-26 PROCEDURE — 3008F PR BODY MASS INDEX (BMI) DOCUMENTED: ICD-10-PCS | Mod: CPTII,S$GLB,, | Performed by: ORTHOPAEDIC SURGERY

## 2023-07-26 PROCEDURE — 99214 OFFICE O/P EST MOD 30 MIN: CPT | Mod: S$GLB,,, | Performed by: ORTHOPAEDIC SURGERY

## 2023-07-26 NOTE — PROGRESS NOTES
Past Medical History:   Diagnosis Date    Hypertension     Neurocutaneous syndrome     Neuropathy        Past Surgical History:   Procedure Laterality Date    APPENDECTOMY      TONSILLECTOMY         Current Outpatient Medications   Medication Sig    aspirin (ECOTRIN) 81 MG EC tablet Take 81 mg by mouth.    cyanocobalamin (VITAMIN B-12) 1000 MCG tablet Take 100 mcg by mouth once daily.    DULoxetine (CYMBALTA) 60 MG capsule Take 1 capsule (60 mg total) by mouth once daily.    ergocalciferol, vitamin D2, 400 unit Tab Take by mouth.    gabapentin (NEURONTIN) 300 MG capsule Take 3 capsules (900 mg total) by mouth 3 (three) times daily.    levothyroxine (SYNTHROID) 50 MCG tablet levothyroxine 50 mcg tablet   Take 1 tablet every day by oral route.    levothyroxine (SYNTHROID) 50 MCG tablet Take 1 tablet by mouth once daily.    LORazepam (ATIVAN) 0.5 MG tablet Take 0.5 mg by mouth every evening.    LORazepam (ATIVAN) 1 MG tablet Take 1 mg by mouth every 8 (eight) hours as needed.    melatonin 5 mg Tab Take by mouth.    metoprolol succinate (TOPROL-XL) 25 MG 24 hr tablet Take 12.5 mg by mouth.    NON FORMULARY MEDICATION Take by mouth once daily.     No current facility-administered medications for this visit.       Review of patient's allergies indicates:   Allergen Reactions    Amitriptyline Other (See Comments)     Pt was very light headed.    Nortriptyline Other (See Comments)     DIZZY AND HEART RACE       Family History   Problem Relation Age of Onset    Skin cancer Father        Social History     Socioeconomic History    Marital status:    Tobacco Use    Smoking status: Former     Types: Cigarettes    Smokeless tobacco: Never    Tobacco comments:     socially in college   Substance and Sexual Activity    Alcohol use: Not Currently     Comment: former beer drinker    Drug use: Never       Chief Complaint: No chief complaint on file.      History of present illness:  This is a 68-year-old female seen for  several months of right knee pain and swelling.  Patient was diagnosed with a Baker cyst previously.  She is had it aspirated twice now.  She had cortisone injection put in her knee both times, but had a significant allergic reaction the 2nd time.  The last time that it was aspirated was back in April and the swelling came back within 2 days.  Patient describes as a constant ache.  Makes it difficult to do her yoga and her gardening.  Pain is only in the back of her knee.      Review of Systems:    Constitution: Negative for chills, fever, and sweats.  Negative for unexplained weight loss.    HENT:  Negative for headaches and blurry vision.    Cardiovascular:Negative for chest pain or irregular heart beat. Negative for hypertension.    Respiratory:  Negative for cough and shortness of breath.    Gastrointestinal: Negative for abdominal pain, heartburn, melena, nausea, and vomitting.    Genitourinary:  Negative bladder incontinence and dysuria.    Musculoskeletal:  See HPI    Neurological: Negative for numbness.    Psychiatric/Behavioral: Negative for depression.  The patient is not nervous/anxious.      Endocrine: Negative for polyuria    Hematologic/Lymphatic: Negative for bleeding problem.  Does not bruise/bleed easily.    Skin: Negative for poor would healing and rash      Physical Examination:    Vital Signs:  There were no vitals filed for this visit.    There is no height or weight on file to calculate BMI.    This a well-developed, well nourished patient in no acute distress.  They are alert and oriented and cooperative to examination.  Pt. walks without an antalgic gait.      Examination of the right knee shows no rashes or erythema. There is a fairly large palpable Baker cyst with mild tenderness. Patient has full range of motion from 0-130°. Patient is nontender to palpation over lateral joint line and nontender to palpation over the medial joint line. Patient has a - Lachman exam, - anterior drawer exam,  and - posterior drawer exam. - Apley exam. Knee is stable to varus and valgus stress. 5 out of 5 motor strength. Palpable distal pulses. Intact light touch sensation. Negative Patellofemoral crepitus      X-rays:  X-rays of the right knee are reviewed which show some mild arthritic change    MRI of the right knee is reviewed and interpreted:1. Complex tear of the anterior horn and body of the lateral meniscus with involvement of the anterior root attachment of the lateral meniscus.  2. 9.2 cm partially ruptured Baker's cyst  3. 10 x 18 mm area of high-grade partial and full-thickness chondral loss and fissuring within the lateral patellar facet and patellar eminence at the level of the patellar midpole.     Assessment::  Right Baker cyst   Right anterior horn lateral meniscal tear   Chondral defect of the patella    Plan:  Reviewed the findings with her today.  We talked about possibly doing a knee arthroscopy with partial lateral meniscectomy and chondroplasty of the patella as well as aspiration of the Baker cyst.  I gave her information about the procedure and we talked about the recovery.  Patient will think about it and call to schedule at her convenience.    All previous pertinent notes including ER visits, physical therapy visits, other orthopedic visits as well as other care for the same musculoskeletal problem were reviewed.  All pertinent lab values and previous imaging was reviewed pertinent to the current visit.    This note was created using [email or web address] voice recognition software that occasionally misinterpreted phrases or words.    Consult note is delivered via Epic messaging service.

## 2023-09-12 ENCOUNTER — TELEPHONE (OUTPATIENT)
Dept: ORTHOPEDICS | Facility: CLINIC | Age: 68
End: 2023-09-12
Payer: MEDICARE

## 2023-09-12 NOTE — TELEPHONE ENCOUNTER
----- Message from Wesley Rojas MA sent at 9/12/2023  2:11 PM CDT -----  Contact: patient  Patient stated she is ready to schedule her procedure on her right knee.    Call back number is [phone]

## 2023-09-13 DIAGNOSIS — Z01.818 PRE-OP TESTING: ICD-10-CM

## 2023-09-13 DIAGNOSIS — S83.241A TEAR OF MEDIAL MENISCUS OF RIGHT KNEE, UNSPECIFIED TEAR TYPE, UNSPECIFIED WHETHER OLD OR CURRENT TEAR, INITIAL ENCOUNTER: Primary | ICD-10-CM

## 2023-09-22 DIAGNOSIS — M71.21 BAKER'S CYST OF KNEE, RIGHT: ICD-10-CM

## 2023-09-22 DIAGNOSIS — S83.241A ACUTE MEDIAL MENISCUS TEAR OF RIGHT KNEE: ICD-10-CM

## 2023-09-22 DIAGNOSIS — S83.241A TEAR OF MEDIAL MENISCUS OF RIGHT KNEE, UNSPECIFIED TEAR TYPE, UNSPECIFIED WHETHER OLD OR CURRENT TEAR, INITIAL ENCOUNTER: Primary | ICD-10-CM

## 2023-09-22 RX ORDER — SODIUM CHLORIDE 9 MG/ML
INJECTION, SOLUTION INTRAVENOUS ONCE
Status: CANCELLED | OUTPATIENT
Start: 2023-09-22 | End: 2023-09-22

## 2023-10-04 ENCOUNTER — TELEPHONE (OUTPATIENT)
Dept: ORTHOPEDICS | Facility: CLINIC | Age: 68
End: 2023-10-04
Payer: MEDICARE

## 2023-10-04 NOTE — TELEPHONE ENCOUNTER
Called and spoke to patient. She would like to postpone surgery.  She declined to r/s at this time.  She reports she will call back to r/s.      Asa

## 2023-10-04 NOTE — TELEPHONE ENCOUNTER
----- Message from Jammie Rosales LPN sent at 10/4/2023 11:15 AM CDT -----  Contact: pt    ----- Message -----  From: Marilin Carr MA  Sent: 10/4/2023  11:15 AM CDT  To: Godwin Alfonso Staff    Questions on up coming surgery   Call back  [phone]

## 2024-01-29 ENCOUNTER — OFFICE VISIT (OUTPATIENT)
Dept: NEUROLOGY | Facility: CLINIC | Age: 69
End: 2024-01-29
Payer: MEDICARE

## 2024-01-29 VITALS
BODY MASS INDEX: 22.41 KG/M2 | SYSTOLIC BLOOD PRESSURE: 151 MMHG | DIASTOLIC BLOOD PRESSURE: 73 MMHG | HEART RATE: 74 BPM | WEIGHT: 132.63 LBS

## 2024-01-29 DIAGNOSIS — G60.9 IDIOPATHIC PERIPHERAL NEUROPATHY: ICD-10-CM

## 2024-01-29 DIAGNOSIS — G60.9 IDIOPATHIC SMALL FIBER PERIPHERAL NEUROPATHY: Primary | ICD-10-CM

## 2024-01-29 PROCEDURE — 99213 OFFICE O/P EST LOW 20 MIN: CPT | Mod: S$GLB,,, | Performed by: PSYCHIATRY & NEUROLOGY

## 2024-01-29 PROCEDURE — 99999 PR PBB SHADOW E&M-EST. PATIENT-LVL III: CPT | Mod: PBBFAC,,, | Performed by: PSYCHIATRY & NEUROLOGY

## 2024-01-29 RX ORDER — DULOXETIN HYDROCHLORIDE 60 MG/1
60 CAPSULE, DELAYED RELEASE ORAL DAILY
Qty: 90 CAPSULE | Refills: 3 | Status: SHIPPED | OUTPATIENT
Start: 2024-01-29 | End: 2025-01-28

## 2024-01-29 RX ORDER — GABAPENTIN 300 MG/1
900 CAPSULE ORAL 3 TIMES DAILY
Qty: 810 CAPSULE | Refills: 3 | Status: SHIPPED | OUTPATIENT
Start: 2024-01-29 | End: 2025-01-28

## 2024-01-29 RX ORDER — PITAVASTATIN CALCIUM 1.04 MG/1
1 TABLET, FILM COATED ORAL
COMMUNITY
Start: 2023-10-26

## 2024-01-29 RX ORDER — ESTRADIOL 0.1 MG/G
CREAM VAGINAL
COMMUNITY
Start: 2024-01-22

## 2024-01-29 NOTE — PATIENT INSTRUCTIONS
Will try adding a topical medication to put on your feet or other areas that are burning at night.  Can use during the day if needed.    Continue your current medications.

## 2024-01-29 NOTE — PROGRESS NOTES
NEUROLOGY  Outpatient Follow Up    [facility]  [address] [facility] [address], [place] [address]  [phone] (office) / [phone] (fax)    Patient Name:  Michelle Denson  :  1955  MR #:  [id number]  Acct #:  [id number]    Date of Neurology Visit: 2024  Name of Neurologist: Heather Miller D.O, ABPN, AOBNP, ABEM    Other Physicians:  Julia Croft MD (Primary Care Physician); No ref. provider found (Referring)      Chief Complaint: Peripheral Neuropathy      History of Present Illness (HPI):  Michelle Denson is a 69 y.o. female here for follow up of neuropathy.    She states she will have some times, earlier in the day where she has minimal to no symptoms.  At night her feet will burn. She can having burning on her shoulders and upper arms and her lips.    She states in the summertime he pain can be worse due to swelling she will get in her legs and feet.     She is currently taking Cymbalta, Gabapentin and B12.  She has no issues with these medications. She is pretty much pain free in the morning.  She can't identify triggers for her sensory changes.   She has never tried prescription topical medications.    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  She is now on estradiol.      She is in yoga. She does not feel her balance is as good as it used to be. She also has flat feet and an old ankle injury that seems to limit her as well.    Interval history:  23:  She states nothing has changed.  Some days are good.  Some days are bad.  She can have bad days where her arms and nose will burn, but most days are her feet with occasional addition of her lips.  She still has the color changes in her fingers.  She had seen rheumatology in the past.  They did not find anything, but she does not think anyone has looked at her since she developed the color change in the fingers when she is cold.    She has no weakness.  She feels very strong.  She is on Gabapentin 900mg TID and  Cymbalta 60mg.    She does yoga 3 times a week.  She has been consistent for almost a year.  She also gardens.  She denies any falls.    No lightheaded or dizziness.  No digestive issues.  No bowel or bladder issues.  No abnormal sweating.    1/4/22:  She states in Oct her neuropathy symptoms started getting worse. She had restarted her statin medications in July.  She feels this is the reason her neuropathy is worse.  She has since stopped her statin in Nov and feels the neuropathy maybe got a little better.  She has not spoken to her PCP about this yet.  She has been taking Gabapentin 3 pills in am, 2 in midday and 3 at night.  She has not always had to take 3 pills at midday.  She still feels this is helpful.  She is also still on Cymbalta.  The last few days her arms and chest have really been bothering her.  There is a lot of burning.  She had felt she was pretty stable for a while, but is upset by the increased intensity lately.  She has had episodes where her right ring finger turns white and ice cold. She also has a toe that does it.  She has not noticed it in her feet as much.    1/21/21  She states she was doing great and doing a lot of walking.  She developed some bursitis in the left hip.  At night she still has a lot of burning in her feet and her lips will burn during the day.  She states they are burning right now.  She states now she notices when it stops.    She states she is getting a vibrating sensation in her left foot and upper thigh.  The foot has been going on for years, but the thigh has only been in the last couple of days.  She has no back pain. She has also had this vibration sensation on the nose.  She still gets some burning in the hands, but not like they used to.  The hands have been better than they were three years ago.  She is doing okay with the three times a day dosing.   She was worried about being on a high dose.     6/30/20  She tolerated the increased Gabapentin.  The  intensity is down.  She feels she is finally getting some relief.  She can tell if she misses a dose.  She is tolerating the Gabapentin well.    She cannot go a whole day without symptoms yet.  She feels worse at night.  Her feet will get puffy, red and burning.  Her lips also bother her.    This is the best she has been in 2 years. She is even walking her dog again.      3/3/20  She initially thought Cymbalta was working, but when the weather started to get more hot and humid her pain came back.    She states the last few days it has become worse again.  She feels it is in more locations as well.  She states the pain feels like a bad sunburn on her arms and ears.  Her lips feel like she has spicy food stuck on her lips.  Her feet feel like she is walking on hot concrete.  She can also feel like someone is clawing her arms.  She also feels a buzzing sensation, often coming on before the pain.  Gabapentin 100mg is currently 1 in am, 2 at noon, 2 at dinner and 3 at night.  Her pain seems to escalate in the afternoon.  Cymbalta is at 60mg for the past few weeks.  She has no changes in bowel or bladder function.  She is not dizzy or passing out.  She has normal appetite and does not have digestive issues.  She does not have abnormal sweating.  She has felt like she had something in the back of her throat, but the ENT said it was GERD.  It did go away after taking medication for that.    12/13/19  Michelle Denson is a 64 y.o. female referred for consultation regarding small fiber neuropathy.  Patient states that she has been having painful burning sensations for 2 years.  She states it started in her feet as tingling, that seemed to come on suddenly.  She states from there she had cold sensations in the feet.  She had vascular studies and saw cardiology and these were normal. She then started having burning in the feet, hands and lips.  She feels it is traveling up the arms now.  She states it would feel like a sunburn  pain or a clawing pain.  She states about 2 weeks into this, she had some terrible mouth sores.  She states it took weeks for them to clear, causing her to lose some weight.  She was seen by Dr. Hoffmann in [facility].  She was then seen by Dr. Martinez.  They feel they have run out of things to test for so thought she would come for another opinion.  There was no preceding illness.  She had been on a multivitamin.  She added B complex after the neuropathy symptoms started.  She since stopped because her B6 was high. She stopped her simvastatin though she had been on it for a few years.  She has no digestion issues.  She has no diarrhea or constipation.  She had no previous surgery.  She has no abnormal sweating.  She has no vision changes.  She has no problems with dizziness or passing out.  She has not noted a change in gait or increase in falls.  She has had bloodwork.  She had an MRI of her brain and spine.  She had an ultrasound of her legs.  She had an EMG and was told this was fine.  Skin biopsy.    She has had a colonoscopy in the past, this was fine.     Treatment to date:   B12  Cymbalta 60mg/day - helps  Gabapentin - 900 TID  Horizant - made her wired and she could not sleep  Amitriptyline - side effects  Nortriptyline - side effects  Lyrica - vertigo  Lexapro - no help    Review of Systems:    General: Weight gain: No, Weight Loss: No, Fatigue: No,   Fever: No, Chills: No, Night Sweats: No, Insomnia: No, Excessive sleeping: No   Respiratory:  Cough: No, Shortness of Breath: No,   Wheezing: No, Excessive Snoring: No, Coughing up blood: No  Endocrine: Heat Intolerance: No, Cold Intolerance: Yes,   Excessive Thirst: No, Excessive Hunger: No,   Eyes:  Blurred Vision: No, Double Vision: No,   Light Sensitivity: No, Eye pain: No  Musculoskeletal: Muscle Aches/Pain: No, Joint Pain/Swelling: Yes, Muscle Cramps: No, Muscle Weakness: No, Neck Pain: No, Back Pain: No   Neurological: Difficulty Walking/Falls: No,  Headache Migraine: No, Dizziness/Vertigo: No, Fainting: No, Difficulty with Speech: No, Weakness: No, Tingling/Numbness: No, Tremors: No, Memory Problems: No, Seizures: No, Difficulty Swallowing: No, Altered Taste: No.  Cardiovascular: Chest Pain: No, Shortness of Breath: No,   Palpitations: No,  Gastrointestinal: Nausea/Vomiting: No, Constipation: No, Diarrhea: No, Bloody Stools: No   Psych/Cog:  Depression: No, Anxiety: No, Hallucinations: No, Problems Concentrating: No  : Frequent Urination: Yes, Incontinence: No, Urinary Infections: No, Blood of Urine: No  ENT:Hearing Loss: No, Earache: No, Ringing in Ears: No,   Facial Pain: No, Chronic Congestion: No   Immune: Seasonal Allergies: No, Hives and/or Rashes: No  The remainder of the review of twelve body systems was reviewed and normal.        Past Medical, Surgical, Family & Social History:   Reviewed, verified and updated as applicable.    Home Medications:     Current Outpatient Medications:     cyanocobalamin (VITAMIN B-12) 1000 MCG tablet, Take 100 mcg by mouth once daily., Disp: , Rfl:     ergocalciferol, vitamin D2, 400 unit Tab, Take by mouth., Disp: , Rfl:     estradioL (ESTRACE) 0.01 % (0.1 mg/gram) vaginal cream, APPLY TWO GRAMS TO THE VAGINA DAILY AS DIRECTED, Disp: , Rfl:     levothyroxine (SYNTHROID) 50 MCG tablet, levothyroxine 50 mcg tablet  Take 1 tablet every day by oral route., Disp: , Rfl:     levothyroxine (SYNTHROID) 50 MCG tablet, Take 1 tablet by mouth once daily., Disp: , Rfl:     LIVALO 1 mg Tab tablet, Take 1 mg by mouth., Disp: , Rfl:     LORazepam (ATIVAN) 1 MG tablet, Take 1 mg by mouth every 8 (eight) hours as needed., Disp: , Rfl:     melatonin 5 mg Tab, Take 10 mg by mouth., Disp: , Rfl:     metoprolol succinate (TOPROL-XL) 25 MG 24 hr tablet, Take 12.5 mg by mouth 2 (two) times daily., Disp: , Rfl:     aspirin (ECOTRIN) 81 MG EC tablet, Take 81 mg by mouth., Disp: , Rfl:     DULoxetine (CYMBALTA) 60 MG capsule, Take 1 capsule  (60 mg total) by mouth once daily., Disp: 90 capsule, Rfl: 3    gabapentin (NEURONTIN) 300 MG capsule, Take 3 capsules (900 mg total) by mouth 3 (three) times daily., Disp: 810 capsule, Rfl: 3    LORazepam (ATIVAN) 0.5 MG tablet, Take 0.5 mg by mouth every evening., Disp: , Rfl:     NON FORMULARY MEDICATION, Take by mouth once daily., Disp: , Rfl:     Physical Examination:  Reviewed, verified and updated as applicable.    BP (!) 151/73 (BP Location: Right arm, Patient Position: Sitting, BP Method: Medium (Automatic))   Pulse 74   Wt 60.2 kg (132 lb 9.7 oz)   BMI 22.41 kg/m²     GENERAL:  General appearance: Well, non-toxic appearing.  No apparent distress.  Extremities: normal.    MENTAL STATUS:  Alertness, attention span & concentration: normal.  Language: normal.  Orientation to self, place & time:  normal.  Memory, recent & remote: normal.  Fund of knowledge: normal.     SPEECH:  Clear and fluent.  Follows complex commands.     CRANIAL NERVES:  Cranial Nerves II-XII were examined.  II - Visual fields: normal.  III, IV, VI: PERRL, EOMI, No ptosis, No nystagmus.  V - Facial sensation: normal.  VII - Face symmetry & mobility: normal.  VIII - Hearing: normal.  IX, X - Palate: mobile & midline.  XI - Shoulder shrug: normal.  XII - Tongue protrusion: normal.     GROSS MOTOR:  Gait & station: normal.  Tone: normal.  Abnormal movements: none.       MUSCLE STRENGTH:      Fascics Atrophy RIGHT    LEFT Atrophy Fascics     5 Deltoids 5       5 Biceps 5       5 Triceps 5       5 Forearm.Pr. 5       5 Inteross. 5                         5 Iliopsoas 5       5 Quads 5       5 Hams 5       5 Dorsiflex 5       5 Plantar Flex 5          REFLEXES:     RIGHT Reflex    LEFT   2+ Biceps 2+   2+ Brachiorad. 2+   2+ Triceps 2+           2+ Patellar 2+   2+ Ankle 2+           Down PLANTAR Down      SENSORY:  Vibration sense: Normal throughout.  Sharp touch: Normal throughout.       Diagnostic Data Reviewed:   Records from Dr. Martinez's  office were reviewed.  Patient was seen for small fiber neuropathy.  Labs were completed.  Skin biopsy was completed.  Patient was using neuropathic pain medications for management of symptoms.  Referred for second opinion.     Lab results available today reveal KAREN, Free Kappa, Free Lambda, SSA and SSB were negative.     EMG from Dr. Hoffmann will be requested.  Labs from Dr. Martinez will be requested.     Patient reports Lyme was neg.  HIV was tested and neg.  Colonoscopy was neg.     Skin biopsy received.  Performed 3/20/18.  Left thigh:  Skin with low normal nerve fiber density.  Left calf:  Skin with normal fiber density.  Test is suggestive, but not diagnostic of small fiber neuropathy.    Labs reviewed:  SPEP is normal  Kappa and Lambda light chains normal.  SSA, SSB normal  B6 normal  TTG normal  MMA normla  Copper normal  A1c normal  ACE normal  Hep C normal  B1 normal      Component      Latest Ref Rng & Units 3/3/2020   Arsenic      0 - 12 ng/mL <1   Lead      0.0 - 4.9 mcg/dL <1.0   Cadmium      0.0 - 4.9 ng/mL 0.2   Mercury      0 - 9 ng/mL <1   Venous/Capillary       Venous   Hepatitis B Surface Ag      Negative Negative   Hep B C IgM      Negative Negative   Hep A IgM      Negative Negative   Hepatitis C Ab      Negative Negative   Anti Sm Antibody      0.00 - 0.99 Ratio 0.07   Anti-Sm Interpretation      Negative Negative   CRP      0.0 - 8.2 mg/L 0.8   Cryoglobulin, Qual      Absent Absent     Assessment and Plan:  Michelle Denson is a 64 y.o., right handed woman with a history of painful peripheral neuropathy believed to be small fiber neuropathy.  Based on her current workup, this appears to be idiopathic.  Her previous workup and additional workup in this clinic was normal.       She will continue Cymbalta daily.  She will continue Gabapentin TID.  We reviewed their other medication options such as Trileptal and even topical agents.  She is amenable to trying a topical agent at night.  An Rx was  submitted to the compounding pharmacy.    Information on patient AVS:  Will try adding a topical medication to put on your feet or other areas that are burning at night.  Can use during the day if needed.  Continue your current medications.       Important to note, also  has a past medical history of Hypertension, Neurocutaneous syndrome, and Neuropathy.     Time Spent: I spent a total of 21 minutes on the day of the visit.This includes face to face time and non-face to face time preparing to see the patient (eg, review of tests), Obtaining and/or reviewing separately obtained history, Documenting clinical information in the electronic or other health record, Independently interpreting resultsand communicating results to the patient/family/caregiver, or Care coordination.     This note was created with voice recognition software.  Grammatical, syntax and spelling errors may be inevitable.      Heather Miller D.O, ABPN, AOBNP, ABEM

## 2025-01-08 ENCOUNTER — TELEPHONE (OUTPATIENT)
Dept: NEUROLOGY | Facility: CLINIC | Age: 70
End: 2025-01-08
Payer: MEDICARE

## 2025-01-08 NOTE — TELEPHONE ENCOUNTER
----- Message from Ebony sent at 1/8/2025 10:23 AM CST -----  Contact: Patient  Type:  Sooner Apoointment Request    Caller is requesting a sooner appointment.  Caller declined first available appointment listed below.  Caller will not accept being placed on the waitlist and is requesting a message be sent to doctor.  Name of Caller:Patient    When is the first available appointment? Past March 2025    Symptoms:Annual/ Refill/ Neuropathy     Would the patient rather a call back or a response via [facility]? Call    Best Call Back Number:[phone]    Additional Information: Please call to advise

## 2025-02-05 DIAGNOSIS — G60.9 IDIOPATHIC SMALL FIBER PERIPHERAL NEUROPATHY: ICD-10-CM

## 2025-02-07 RX ORDER — GABAPENTIN 300 MG/1
900 CAPSULE ORAL 3 TIMES DAILY
Qty: 810 CAPSULE | Refills: 0 | Status: SHIPPED | OUTPATIENT
Start: 2025-02-07 | End: 2025-02-20 | Stop reason: SDUPTHER

## 2025-02-07 RX ORDER — DULOXETIN HYDROCHLORIDE 60 MG/1
60 CAPSULE, DELAYED RELEASE ORAL DAILY
Qty: 90 CAPSULE | Refills: 0 | Status: SHIPPED | OUTPATIENT
Start: 2025-02-07 | End: 2025-02-20 | Stop reason: SDUPTHER

## 2025-02-20 ENCOUNTER — OFFICE VISIT (OUTPATIENT)
Dept: NEUROLOGY | Facility: CLINIC | Age: 70
End: 2025-02-20
Payer: MEDICARE

## 2025-02-20 VITALS
DIASTOLIC BLOOD PRESSURE: 70 MMHG | WEIGHT: 116.88 LBS | HEIGHT: 64 IN | HEART RATE: 73 BPM | SYSTOLIC BLOOD PRESSURE: 150 MMHG | TEMPERATURE: 90 F | BODY MASS INDEX: 19.96 KG/M2 | RESPIRATION RATE: 17 BRPM

## 2025-02-20 DIAGNOSIS — G60.9 IDIOPATHIC SMALL FIBER PERIPHERAL NEUROPATHY: Primary | ICD-10-CM

## 2025-02-20 DIAGNOSIS — R22.0 LIP SWELLING: ICD-10-CM

## 2025-02-20 DIAGNOSIS — M25.519 SHOULDER PAIN, UNSPECIFIED CHRONICITY, UNSPECIFIED LATERALITY: ICD-10-CM

## 2025-02-20 PROCEDURE — 1159F MED LIST DOCD IN RCRD: CPT | Mod: CPTII,S$GLB,, | Performed by: PSYCHIATRY & NEUROLOGY

## 2025-02-20 PROCEDURE — 3078F DIAST BP <80 MM HG: CPT | Mod: CPTII,S$GLB,, | Performed by: PSYCHIATRY & NEUROLOGY

## 2025-02-20 PROCEDURE — 3288F FALL RISK ASSESSMENT DOCD: CPT | Mod: CPTII,S$GLB,, | Performed by: PSYCHIATRY & NEUROLOGY

## 2025-02-20 PROCEDURE — 99999 PR PBB SHADOW E&M-EST. PATIENT-LVL IV: CPT | Mod: PBBFAC,,, | Performed by: PSYCHIATRY & NEUROLOGY

## 2025-02-20 PROCEDURE — 99213 OFFICE O/P EST LOW 20 MIN: CPT | Mod: S$GLB,,, | Performed by: PSYCHIATRY & NEUROLOGY

## 2025-02-20 PROCEDURE — 1125F AMNT PAIN NOTED PAIN PRSNT: CPT | Mod: CPTII,S$GLB,, | Performed by: PSYCHIATRY & NEUROLOGY

## 2025-02-20 PROCEDURE — 1160F RVW MEDS BY RX/DR IN RCRD: CPT | Mod: CPTII,S$GLB,, | Performed by: PSYCHIATRY & NEUROLOGY

## 2025-02-20 PROCEDURE — 1101F PT FALLS ASSESS-DOCD LE1/YR: CPT | Mod: CPTII,S$GLB,, | Performed by: PSYCHIATRY & NEUROLOGY

## 2025-02-20 PROCEDURE — 3077F SYST BP >= 140 MM HG: CPT | Mod: CPTII,S$GLB,, | Performed by: PSYCHIATRY & NEUROLOGY

## 2025-02-20 PROCEDURE — 3008F BODY MASS INDEX DOCD: CPT | Mod: CPTII,S$GLB,, | Performed by: PSYCHIATRY & NEUROLOGY

## 2025-02-20 RX ORDER — GABAPENTIN 300 MG/1
900 CAPSULE ORAL 3 TIMES DAILY
Qty: 810 CAPSULE | Refills: 2 | Status: SHIPPED | OUTPATIENT
Start: 2025-02-20 | End: 2026-02-20

## 2025-02-20 RX ORDER — DULOXETIN HYDROCHLORIDE 60 MG/1
60 CAPSULE, DELAYED RELEASE ORAL DAILY
Qty: 90 CAPSULE | Refills: 2 | Status: SHIPPED | OUTPATIENT
Start: 2025-02-20 | End: 2026-02-20

## 2025-02-20 NOTE — PATIENT INSTRUCTIONS
Will use an alternative pharmacy ([facility]) to try to get a compounded topical agent for you to use on an as needed basis.      Continue Gabapentin and Duloxetine.  These have been renewed for the year.    For the shoulder pain, you could see Dr. Connelly.      Return to clinic in one year.  Reach out sooner if there are any issues.

## 2025-02-20 NOTE — PROGRESS NOTES
NEUROLOGY  Outpatient Follow Up  [facility]  [address] [facility], [place] [address]  [phone] (office) / [phone] (fax)    Patient Name:  Michelle Denson  :  1955  MR #:  [id number]  Acct #:  [id number]    Date of Neurology Visit: 2025  Name of Neurologist: Heather Miller D.O, ABPN, AOBNP, ABEM    Other Physicians:  Julia Croft MD (Primary Care Physician); No ref. provider found (Referring)      Chief Complaint: Peripheral Neuropathy and Medication Refill      History of Present Illness (HPI):  Michelle Denson is a 70 y.o. female here for follow up of neuropathy.    She never received the topical compound from [facility].      She states she will have some times, earlier in the day where she has minimal to no symptoms.  At night her feet will burn. She can having burning on her shoulders and upper arms and her lips.  She states in the summertime he pain can be worse due to swelling she will get in her legs and feet.   She is currently taking Cymbalta, Gabapentin and B12.  She has no issues with these medications. She is pretty much pain free in the morning.  She can't identify triggers for her sensory changes.   She has never tried prescription topical medications.    In Oct and then again in  she would awaken with burning and her lips would peel.  They would be red, puffy and inflamed. This would last for about a week or two.    She is having some shoulder pain.  She will notice that holding her arms or neck in certain positions might make her arm feel numb, but adjusting instantly makes the numbness go away.    Interval history:  24:  She states she will have some times, earlier in the day where she has minimal to no symptoms.  At night her feet will burn. She can having burning on her shoulders and upper arms and her lips.  She states in the summertime he pain can be worse due to swelling she will get in her legs and feet.   She is currently taking  Cymbalta, Gabapentin and B12.  She has no issues with these medications. She is pretty much pain free in the morning.  She can't identify triggers for her sensory changes.   She has never tried prescription topical medications.  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  She is now on estradiol.    She is in yoga. She does not feel her balance is as good as it used to be. She also has flat feet and an old ankle injury that seems to limit her as well.    1/19/23:  She states nothing has changed.  Some days are good.  Some days are bad.  She can have bad days where her arms and nose will burn, but most days are her feet with occasional addition of her lips.  She still has the color changes in her fingers.  She had seen rheumatology in the past.  They did not find anything, but she does not think anyone has looked at her since she developed the color change in the fingers when she is cold.    She has no weakness.  She feels very strong.  She is on Gabapentin 900mg TID and Cymbalta 60mg.    She does yoga 3 times a week.  She has been consistent for almost a year.  She also gardens.  She denies any falls.    No lightheaded or dizziness.  No digestive issues.  No bowel or bladder issues.  No abnormal sweating.    1/4/22:  She states in Oct her neuropathy symptoms started getting worse. She had restarted her statin medications in July.  She feels this is the reason her neuropathy is worse.  She has since stopped her statin in Nov and feels the neuropathy maybe got a little better.  She has not spoken to her PCP about this yet.  She has been taking Gabapentin 3 pills in am, 2 in midday and 3 at night.  She has not always had to take 3 pills at midday.  She still feels this is helpful.  She is also still on Cymbalta.  The last few days her arms and chest have really been bothering her.  There is a lot of burning.  She had felt she was pretty stable for a while, but is upset by the increased intensity lately.  She has  had episodes where her right ring finger turns white and ice cold. She also has a toe that does it.  She has not noticed it in her feet as much.    1/21/21  She states she was doing great and doing a lot of walking.  She developed some bursitis in the left hip.  At night she still has a lot of burning in her feet and her lips will burn during the day.  She states they are burning right now.  She states now she notices when it stops.    She states she is getting a vibrating sensation in her left foot and upper thigh.  The foot has been going on for years, but the thigh has only been in the last couple of days.  She has no back pain. She has also had this vibration sensation on the nose.  She still gets some burning in the hands, but not like they used to.  The hands have been better than they were three years ago.  She is doing okay with the three times a day dosing.   She was worried about being on a high dose.     6/30/20  She tolerated the increased Gabapentin.  The intensity is down.  She feels she is finally getting some relief.  She can tell if she misses a dose.  She is tolerating the Gabapentin well.    She cannot go a whole day without symptoms yet.  She feels worse at night.  Her feet will get puffy, red and burning.  Her lips also bother her.    This is the best she has been in 2 years. She is even walking her dog again.      3/3/20  She initially thought Cymbalta was working, but when the weather started to get more hot and humid her pain came back.    She states the last few days it has become worse again.  She feels it is in more locations as well.  She states the pain feels like a bad sunburn on her arms and ears.  Her lips feel like she has spicy food stuck on her lips.  Her feet feel like she is walking on hot concrete.  She can also feel like someone is clawing her arms.  She also feels a buzzing sensation, often coming on before the pain.  Gabapentin 100mg is currently 1 in am, 2 at noon, 2 at  dinner and 3 at night.  Her pain seems to escalate in the afternoon.  Cymbalta is at 60mg for the past few weeks.  She has no changes in bowel or bladder function.  She is not dizzy or passing out.  She has normal appetite and does not have digestive issues.  She does not have abnormal sweating.  She has felt like she had something in the back of her throat, but the ENT said it was GERD.  It did go away after taking medication for that.    12/13/19  Michelle Denson is a 64 y.o. female referred for consultation regarding small fiber neuropathy.  Patient states that she has been having painful burning sensations for 2 years.  She states it started in her feet as tingling, that seemed to come on suddenly.  She states from there she had cold sensations in the feet.  She had vascular studies and saw cardiology and these were normal. She then started having burning in the feet, hands and lips.  She feels it is traveling up the arms now.  She states it would feel like a sunburn pain or a clawing pain.  She states about 2 weeks into this, she had some terrible mouth sores.  She states it took weeks for them to clear, causing her to lose some weight.  She was seen by Dr. Hoffmann in [place] first.  She was then seen by Dr. Martinez.  They feel they have run out of things to test for so thought she would come for another opinion.  There was no preceding illness.  She had been on a multivitamin.  She added B complex after the neuropathy symptoms started.  She since stopped because her B6 was high. She stopped her simvastatin though she had been on it for a few years.  She has no digestion issues.  She has no diarrhea or constipation.  She had no previous surgery.  She has no abnormal sweating.  She has no vision changes.  She has no problems with dizziness or passing out.  She has not noted a change in gait or increase in falls.  She has had bloodwork.  She had an MRI of her brain and spine.  She had an ultrasound of her legs.  She  had an EMG and was told this was fine.  Skin biopsy.    She has had a colonoscopy in the past, this was fine.     Treatment to date:   B12  Cymbalta 60mg/day - helps  Gabapentin - 900 TID  Horizant - made her wired and she could not sleep  Amitriptyline - side effects  Nortriptyline - side effects  Lyrica - vertigo  Lexapro - no help    Review of Systems:    General: Weight gain: No, Weight Loss: No, Fatigue: No,   Fever: No, Chills: No, Night Sweats: No, Insomnia: No, Excessive sleeping: No   Respiratory:  Cough: No, Shortness of Breath: No,   Wheezing: No, Excessive Snoring: No, Coughing up blood: No  Endocrine: Heat Intolerance: No, Cold Intolerance: Yes,   Excessive Thirst: No, Excessive Hunger: No,   Eyes:  Blurred Vision: No, Double Vision: No,   Light Sensitivity: No, Eye pain: No  Musculoskeletal: Muscle Aches/Pain: No, Joint Pain/Swelling: Yes, Muscle Cramps: No, Muscle Weakness: No, Neck Pain: No, Back Pain: No   Neurological: Difficulty Walking/Falls: No, Headache Migraine: No, Dizziness/Vertigo: No, Fainting: No, Difficulty with Speech: No, Weakness: No, Tingling/Numbness: No, Tremors: No, Memory Problems: No, Seizures: No, Difficulty Swallowing: No, Altered Taste: No.  Cardiovascular: Chest Pain: No, Shortness of Breath: No,   Palpitations: No,  Gastrointestinal: Nausea/Vomiting: No, Constipation: No, Diarrhea: No, Bloody Stools: No   Psych/Cog:  Depression: No, Anxiety: No, Hallucinations: No, Problems Concentrating: No  : Frequent Urination: Yes, Incontinence: No, Urinary Infections: No, Blood of Urine: No  ENT:Hearing Loss: No, Earache: No, Ringing in Ears: No,   Facial Pain: No, Chronic Congestion: No   Immune: Seasonal Allergies: No, Hives and/or Rashes: No  The remainder of the review of twelve body systems was reviewed and normal.        Past Medical, Surgical, Family & Social History:   Reviewed, verified and updated as applicable.    Home Medications:     Current Outpatient Medications:      "cyanocobalamin (VITAMIN B-12) 1000 MCG tablet, Take 100 mcg by mouth once daily., Disp: , Rfl:     DULoxetine (CYMBALTA) 60 MG capsule, Take 1 capsule (60 mg total) by mouth once daily., Disp: 90 capsule, Rfl: 0    ergocalciferol, vitamin D2, 400 unit Tab, Take by mouth., Disp: , Rfl:     estradioL (ESTRACE) 0.01 % (0.1 mg/gram) vaginal cream, APPLY TWO GRAMS TO THE VAGINA DAILY AS DIRECTED, Disp: , Rfl:     gabapentin (NEURONTIN) 300 MG capsule, Take 3 capsules (900 mg total) by mouth 3 (three) times daily., Disp: 810 capsule, Rfl: 0    levothyroxine (SYNTHROID) 50 MCG tablet, Take 1 tablet by mouth once daily., Disp: , Rfl:     LIVALO 1 mg Tab tablet, Take 1 mg by mouth., Disp: , Rfl:     LORazepam (ATIVAN) 1 MG tablet, Take 1 mg by mouth every 8 (eight) hours as needed., Disp: , Rfl:     melatonin 5 mg Tab, Take 10 mg by mouth., Disp: , Rfl:     metoprolol succinate (TOPROL-XL) 25 MG 24 hr tablet, Take 12.5 mg by mouth 2 (two) times daily., Disp: , Rfl:     Physical Examination:  Reviewed, verified and updated as applicable.    BP (!) 150/70 (BP Location: Left arm, Patient Position: Sitting)   Pulse 73   Temp (!) 90.4 °F (32.4 °C)   Resp 17   Ht 5' 4" (1.626 m)   Wt 53 kg (116 lb 13.5 oz)   BMI 20.06 kg/m²     GENERAL:  General appearance: Well, non-toxic appearing.  No apparent distress.  Extremities: normal.    MENTAL STATUS:  Alertness, attention span & concentration: normal.  Language: normal.  Orientation to self, place & time:  normal.  Memory, recent & remote: normal.  Fund of knowledge: normal.     SPEECH:  Clear and fluent.  Follows complex commands.     CRANIAL NERVES:  Cranial Nerves II-XII were examined.  II - Visual fields: normal.  III, IV, VI: PERRL, EOMI, No ptosis, No nystagmus.  V - Facial sensation: normal.  VII - Face symmetry & mobility: normal.  VIII - Hearing: normal.  IX, X - Palate: mobile & midline.  XI - Shoulder shrug: normal.  XII - Tongue protrusion: normal.     GROSS " MOTOR:  Gait & station: normal.  Tone: normal.  Abnormal movements: none.       MUSCLE STRENGTH:      Fascics Atrophy RIGHT    LEFT Atrophy Fascics     5 Deltoids 5       5 Biceps 5       5 Triceps 5       5 Forearm.Pr. 5       5 Inteross. 5                         5 Iliopsoas 5       5 Quads 5       5 Hams 5       5 Dorsiflex 5       5 Plantar Flex 5          REFLEXES:     RIGHT Reflex    LEFT   2+ Biceps 2+   2+ Brachiorad. 2+   2+ Triceps 2+           2+ Patellar 2+   2+ Ankle 2+           Down PLANTAR Down      SENSORY:  Vibration sense: Normal throughout.  Sharp touch: Normal throughout.       Diagnostic Data Reviewed:   Records from Dr. Martinez's office were reviewed.  Patient was seen for small fiber neuropathy.  Labs were completed.  Skin biopsy was completed.  Patient was using neuropathic pain medications for management of symptoms.  Referred for second opinion.     Lab results available today reveal KAREN, Free Kappa, Free Lambda, SSA and SSB were negative.     EMG from Dr. Hoffmann will be requested.  Labs from Dr. Martinez will be requested.     Patient reports Lyme was neg.  HIV was tested and neg.  Colonoscopy was neg.     Skin biopsy received.  Performed 3/20/18.  Left thigh:  Skin with low normal nerve fiber density.  Left calf:  Skin with normal fiber density.  Test is suggestive, but not diagnostic of small fiber neuropathy.    Labs reviewed:  SPEP is normal  Kappa and Lambda light chains normal.  SSA, SSB normal  B6 normal  TTG normal  MMA normla  Copper normal  A1c normal  ACE normal  Hep C normal  B1 normal      Component      Latest Ref Rng & Units 3/3/2020   Arsenic      0 - 12 ng/mL <1   Lead      0.0 - 4.9 mcg/dL <1.0   Cadmium      0.0 - 4.9 ng/mL 0.2   Mercury      0 - 9 ng/mL <1   Venous/Capillary       Venous   Hepatitis B Surface Ag      Negative Negative   Hep B C IgM      Negative Negative   Hep A IgM      Negative Negative   Hepatitis C Ab      Negative Negative   Anti Sm Antibody       0.00 - 0.99 Ratio 0.07   Anti-Sm Interpretation      Negative Negative   CRP      0.0 - 8.2 mg/L 0.8   Cryoglobulin, Qual      Absent Absent     Assessment and Plan:  Michelle Denson is a 64 y.o., right handed woman with a history of painful peripheral neuropathy believed to be small fiber neuropathy.  Based on her current workup, this appears to be idiopathic.  Her previous workup and additional workup in this clinic was normal.       She will continue Cymbalta daily.  She will continue Gabapentin TID.  We reviewed their other medication options such as Trileptal and even topical agents.  She is amenable to trying a topical agent at night.  An Rx was submitted to the compounding pharmacy but she did not receive it.  Will try again with another pharmacy.    Unable to explain her lip symptoms.  This seems like a dermatitis reaction?  Would advise photographing if it occurs again and show dermatology.      She is noting shoulder pain.  Advise speaking with Dr. Connelly about this.    Information on patient AVS:  Will use an alternative pharmacy ([facility]) to try to get a compounded topical agent for you to use on an as needed basis.    Continue Gabapentin and Duloxetine.  These have been renewed for the year.  For the shoulder pain, you could see Dr. Connelly.      Follow up in a year, or sooner if needed.    Important to note, also  has a past medical history of Hypertension, Neurocutaneous syndrome, and Neuropathy.     Time Spent: I spent a total of 25 minutes on the day of the visit.This includes face to face time and non-face to face time preparing to see the patient (eg, review of tests), Obtaining and/or reviewing separately obtained history, Documenting clinical information in the electronic or other health record, Independently interpreting resultsand communicating results to the patient/family/caregiver, or Care coordination.     This note was created with voice recognition software.  Grammatical,  syntax and spelling errors may be inevitable.      Heather Miller D.O, ABPN, AOBNP, ABEM

## 2025-03-06 ENCOUNTER — PATIENT MESSAGE (OUTPATIENT)
Dept: NEUROLOGY | Facility: CLINIC | Age: 70
End: 2025-03-06
Payer: MEDICARE

## 2025-03-25 DIAGNOSIS — M25.519 SHOULDER PAIN, UNSPECIFIED CHRONICITY, UNSPECIFIED LATERALITY: Primary | ICD-10-CM

## 2025-03-26 ENCOUNTER — OFFICE VISIT (OUTPATIENT)
Dept: ORTHOPEDICS | Facility: CLINIC | Age: 70
End: 2025-03-26
Payer: MEDICARE

## 2025-03-26 ENCOUNTER — HOSPITAL ENCOUNTER (OUTPATIENT)
Dept: RADIOLOGY | Facility: HOSPITAL | Age: 70
Discharge: HOME | End: 2025-03-26
Attending: ORTHOPAEDIC SURGERY
Payer: MEDICARE

## 2025-03-26 DIAGNOSIS — M75.101 TEAR OF RIGHT ROTATOR CUFF, UNSPECIFIED TEAR EXTENT, UNSPECIFIED WHETHER TRAUMATIC: Primary | ICD-10-CM

## 2025-03-26 DIAGNOSIS — M75.100 ROTATOR CUFF SYNDROME, UNSPECIFIED LATERALITY: Primary | ICD-10-CM

## 2025-03-26 DIAGNOSIS — M25.519 SHOULDER PAIN, UNSPECIFIED CHRONICITY, UNSPECIFIED LATERALITY: ICD-10-CM

## 2025-03-26 DIAGNOSIS — M75.102 TEAR OF LEFT ROTATOR CUFF, UNSPECIFIED TEAR EXTENT, UNSPECIFIED WHETHER TRAUMATIC: ICD-10-CM

## 2025-03-26 PROCEDURE — 99999 PR PBB SHADOW E&M-EST. PATIENT-LVL I: CPT | Mod: PBBFAC,,, | Performed by: ORTHOPAEDIC SURGERY

## 2025-03-26 PROCEDURE — 73030 X-RAY EXAM OF SHOULDER: CPT | Mod: TC,50,PO

## 2025-03-26 PROCEDURE — 1159F MED LIST DOCD IN RCRD: CPT | Mod: CPTII,S$GLB,, | Performed by: ORTHOPAEDIC SURGERY

## 2025-03-26 PROCEDURE — 73030 X-RAY EXAM OF SHOULDER: CPT | Mod: 26,50,, | Performed by: RADIOLOGY

## 2025-03-26 PROCEDURE — 1160F RVW MEDS BY RX/DR IN RCRD: CPT | Mod: CPTII,S$GLB,, | Performed by: ORTHOPAEDIC SURGERY

## 2025-03-26 PROCEDURE — 99214 OFFICE O/P EST MOD 30 MIN: CPT | Mod: S$GLB,,, | Performed by: ORTHOPAEDIC SURGERY

## 2025-03-26 NOTE — PROGRESS NOTES
Past Medical History:   Diagnosis Date    Hypertension     Neurocutaneous syndrome     Neuropathy        Past Surgical History:   Procedure Laterality Date    APPENDECTOMY      TONSILLECTOMY         Current Outpatient Medications   Medication Sig    cyanocobalamin (VITAMIN B-12) 1000 MCG tablet Take 100 mcg by mouth once daily.    DULoxetine (CYMBALTA) 60 MG capsule Take 1 capsule (60 mg total) by mouth once daily.    ergocalciferol, vitamin D2, 400 unit Tab Take by mouth.    estradioL (ESTRACE) 0.01 % (0.1 mg/gram) vaginal cream APPLY TWO GRAMS TO THE VAGINA DAILY AS DIRECTED    gabapentin (NEURONTIN) 300 MG capsule Take 3 capsules (900 mg total) by mouth 3 (three) times daily.    levothyroxine (SYNTHROID) 50 MCG tablet Take 1 tablet by mouth once daily.    LIVALO 1 mg Tab tablet Take 1 mg by mouth.    LORazepam (ATIVAN) 1 MG tablet Take 1 mg by mouth every 8 (eight) hours as needed.    melatonin 5 mg Tab Take 10 mg by mouth.    metoprolol succinate (TOPROL-XL) 25 MG 24 hr tablet Take 12.5 mg by mouth 2 (two) times daily.     No current facility-administered medications for this visit.       Review of patient's allergies indicates:   Allergen Reactions    Amitriptyline Other (See Comments)     Pt was very light headed.    Nortriptyline Other (See Comments)     DIZZY AND HEART RACE       Family History   Problem Relation Name Age of Onset    Skin cancer Father         Social History[1]    Chief Complaint: No chief complaint on file.      History of present illness:  Is a 70-year-old female patient mine seen for bilateral shoulder pain.  Pain has been ongoing for 3 or 4 months now.  Slowly getting worse.  She thinks that she either lifted something too heavy or aggravated it doing yoga.  Right shoulder has bothered her previously where she got a cortisone injection.  Right shoulder stiff.  Left shoulder pops all the time.  Both have limited range of motion and she does have some numbness that goes down into her right  hand.  Pain at night this the worst and can be up to a 10/10 at times.    Prior treatment:  NSAIDs        Review of Systems:    Constitution: Negative for chills, fever, and sweats.  Negative for unexplained weight loss.    HENT:  Negative for headaches and blurry vision.    Cardiovascular:Negative for chest pain or irregular heart beat. Negative for hypertension.    Respiratory:  Negative for cough and shortness of breath.    Gastrointestinal: Negative for abdominal pain, heartburn, melena, nausea, and vomitting.    Genitourinary:  Negative bladder incontinence and dysuria.    Musculoskeletal:  See HPI    Neurological: Negative for numbness.    Psychiatric/Behavioral: Negative for depression.  The patient is not nervous/anxious.      Endocrine: Negative for polyuria    Hematologic/Lymphatic: Negative for bleeding problem.  Does not bruise/bleed easily.    Skin: Negative for poor would healing and rash      Physical Examination:    Vital Signs:  There were no vitals filed for this visit.    There is no height or weight on file to calculate BMI.    This a well-developed, well nourished patient in no acute distress.  They are alert and oriented and cooperative to examination.  Pt. walks without an antalgic gait.      Examination of bilateral shoulders shows no rashes or erythema.  Significant popping particularly of the left shoulder.  There are no masses, ecchymosis, or atrophy. The patient has full range of motion in forward flexion, external rotation, and internal rotation to the mid T-spine. The patient has positive Wynne and Neer test.  Positive Milan's test.. - Speeds test. Nontender to palpation over a.c. joint. Normal stability anteriorly, posteriorly, and negative sulcus sign. Passive range of motion: Forward flexion of 180°, external rotation at 90° of 90°, internal rotation of 50°, and external rotation at 0° of 50°. 2+ radial pulse. Intact axillary, radial, median and ulnar sensation.  4+ out of 5  resisted forward flexion, external rotation, and negative lift off test.      X-rays:  X-rays of both shoulders are ordered and reviewed which show some mild glenohumeral arthritic change on the right with small humeral spur.  Some moderate right AC arthritis and mild left AC arthritis.         Assessment:  Bilateral rotator cuff pathology  Mild right shoulder arthritis    Plan:  I reviewed the findings with her today.  We talked about treatment options.  Patient elected for bilateral shoulder MRIs to make sure she did not tear anything.  Follow up if to the was completed.            All previous pertinent notes including ER visits, physical therapy visits, other orthopedic visits as well as other care for the same musculoskeletal problem were reviewed.  All pertinent lab values and previous imaging was reviewed pertinent to the current visit.    This note was created using [facility] voice recognition software that occasionally misinterpreted phrases or words.    Consult note is delivered via Epic messaging service.           [1]   Social History  Socioeconomic History    Marital status:    Tobacco Use    Smoking status: Former     Types: Cigarettes    Smokeless tobacco: Never    Tobacco comments:     socially in college   Substance and Sexual Activity    Alcohol use: Not Currently     Comment: former beer drinker    Drug use: Never     Social Drivers of Health     Financial Resource Strain: Patient Declined (1/27/2024)    Overall Financial Resource Strain (CARDIA)     Difficulty of Paying Living Expenses: Patient declined   Food Insecurity: Patient Declined (1/27/2024)    Hunger Vital Sign     Worried About Running Out of Food in the Last Year: Patient declined     Ran Out of Food in the Last Year: Patient declined   Transportation Needs: Patient Declined (1/27/2024)    PRAPARE - Transportation     Lack of Transportation (Medical): Patient declined     Lack of Transportation (Non-Medical): Patient declined    Physical Activity: Insufficiently Active (1/27/2024)    Exercise Vital Sign     Days of Exercise per Week: 3 days     Minutes of Exercise per Session: 20 min   Stress: No Stress Concern Present (1/27/2024)    Citizen of [place] [employer] - Occupational Stress Questionnaire     Feeling of Stress : Not at all   Housing Stability: Patient Declined (1/27/2024)    Housing Stability Vital Sign     Unable to Pay for Housing in the Last Year: Patient declined     Unstable Housing in the Last Year: Patient declined

## 2025-08-20 ENCOUNTER — HOSPITAL ENCOUNTER (OUTPATIENT)
Dept: RADIOLOGY | Facility: HOSPITAL | Age: 70
Discharge: HOME | End: 2025-08-20
Attending: ORTHOPAEDIC SURGERY
Payer: MEDICARE

## 2025-08-20 ENCOUNTER — OFFICE VISIT (OUTPATIENT)
Dept: ORTHOPEDICS | Facility: CLINIC | Age: 70
End: 2025-08-20
Payer: MEDICARE

## 2025-08-20 VITALS — WEIGHT: 116.88 LBS | HEIGHT: 64 IN | BODY MASS INDEX: 19.96 KG/M2

## 2025-08-20 DIAGNOSIS — M25.561 RIGHT KNEE PAIN, UNSPECIFIED CHRONICITY: ICD-10-CM

## 2025-08-20 DIAGNOSIS — M17.10 ARTHRITIS OF KNEE: Primary | ICD-10-CM

## 2025-08-20 DIAGNOSIS — M25.561 RIGHT KNEE PAIN, UNSPECIFIED CHRONICITY: Primary | ICD-10-CM

## 2025-08-20 PROCEDURE — 1125F AMNT PAIN NOTED PAIN PRSNT: CPT | Mod: CPTII,S$GLB,, | Performed by: ORTHOPAEDIC SURGERY

## 2025-08-20 PROCEDURE — 73562 X-RAY EXAM OF KNEE 3: CPT | Mod: TC,PO,LT

## 2025-08-20 PROCEDURE — 1159F MED LIST DOCD IN RCRD: CPT | Mod: CPTII,S$GLB,, | Performed by: ORTHOPAEDIC SURGERY

## 2025-08-20 PROCEDURE — 73564 X-RAY EXAM KNEE 4 OR MORE: CPT | Mod: 26,RT,, | Performed by: RADIOLOGY

## 2025-08-20 PROCEDURE — 99214 OFFICE O/P EST MOD 30 MIN: CPT | Mod: 25,S$GLB,, | Performed by: ORTHOPAEDIC SURGERY

## 2025-08-20 PROCEDURE — 3008F BODY MASS INDEX DOCD: CPT | Mod: CPTII,S$GLB,, | Performed by: ORTHOPAEDIC SURGERY

## 2025-08-20 PROCEDURE — 1160F RVW MEDS BY RX/DR IN RCRD: CPT | Mod: CPTII,S$GLB,, | Performed by: ORTHOPAEDIC SURGERY

## 2025-08-20 PROCEDURE — 99999 PR PBB SHADOW E&M-EST. PATIENT-LVL II: CPT | Mod: PBBFAC,,, | Performed by: ORTHOPAEDIC SURGERY

## 2025-08-20 PROCEDURE — 73562 X-RAY EXAM OF KNEE 3: CPT | Mod: 26,LT,, | Performed by: RADIOLOGY

## 2025-08-20 PROCEDURE — 20610 DRAIN/INJ JOINT/BURSA W/O US: CPT | Mod: RT,S$GLB,, | Performed by: ORTHOPAEDIC SURGERY

## 2025-08-20 RX ORDER — TRIAMCINOLONE ACETONIDE 40 MG/ML
40 INJECTION, SUSPENSION INTRA-ARTICULAR; INTRAMUSCULAR
Status: DISCONTINUED | OUTPATIENT
Start: 2025-08-20 | End: 2025-08-20 | Stop reason: HOSPADM

## 2025-08-20 RX ADMIN — TRIAMCINOLONE ACETONIDE 40 MG: 40 INJECTION, SUSPENSION INTRA-ARTICULAR; INTRAMUSCULAR at 03:08
